# Patient Record
Sex: MALE | Race: WHITE
[De-identification: names, ages, dates, MRNs, and addresses within clinical notes are randomized per-mention and may not be internally consistent; named-entity substitution may affect disease eponyms.]

---

## 2017-04-19 NOTE — OP
PATIENT NAME:  AFRICA GARRETT                       MEDICAL RECORD: [id number]
:82                                             LOCATION:D.OPS          
                                                         ADMISSION DATE:        
SURGEON:  DANDRE WASHINGTON MD            
 
 
DATE OF OPERATION:  2017
 
REFERRED BY:  Dr. Peter Robert.
 
PRIMARY CARE PHYSICIAN:  Nabor Bello MD of [place].
 
PREOPERATIVE DIAGNOSES:  Chronic kidney disease stage V due to lupus nephritis
on systemic lupus erythematosus with organ involvement.
 
POSTOPERATIVE DIAGNOSES:  Chronic kidney disease stage V due to lupus nephritis
on systemic lupus erythematosus with organ involvement.
 
OPERATION PERFORMED:  Creation of a left wrist radiocephalic arteriovenous
fistula.
 
SURGEON:  Dandre Washington MD
 
ANESTHESIA:  General with LMA per CRNA.
 
PREOPERATIVE NOTE:  Mr. Garrett is a 36-year-old white male patient with lupus and
severe chronic kidney disease, it is believed he will require dialysis within a
few months and Dr. Peter Robert referred him to me for creation of a
dialysis access.  His mapping venous study demonstrated small veins and it is
thought that he may be a candidate only for a brachiobasilic fistula.  He is
brought to the operating room at this time with plans to try to create a fistula
in the left upper extremity as he is right handed, though I may possibly or will
consider placement of a forearm loop graft that the situation allows.
 
With the patient under anesthesia in supine position, he was prepped and draped
in sterile manner.  The left arm was treated with topical nitroglycerin paste
and a Penrose drain used as a proximal venous tourniquet.  Examination with high
resolution B mode ultrasound demonstrated a satisfactory cephalic and median
antebrachial veins in the forearm and at the wrist.  The radial artery was a
little small, about 3 mm in diameter, but did not appear to be calcified.  The
brachial artery was large.  There is a good median cubital vein, actually two,
one to the cephalic vein and one to the basilic.  The basilic vein in the upper
arm is larger venous drainage, although the cephalic vein in the upper arm was
larger than I anticipated.  I think that in the future the patient would be a
potential suitable candidate for a forearm loop AV graft and also for brachial
translocated basilic vein AV fistula and possibly even brachiocephalic.  Today,
I went ahead with a wrist fistula.
 
I made an incision on the radial aspect of the wrist and exposed the cephalic
vein and treated it with topical papaverine.  Tributaries were divided between
Vicryl ties and small Hemoclips.  The radial artery was exposed and it was as
expected somewhat small.  It was controlled with Silastic loops and treated with
topical papaverine.  The vein was transected and beveled and flushed with
heparinized saline, treated with additional papaverine.  The artery was occluded
and opened.  It was flushed proximally and distally with heparinized saline and
a vein-to-artery end-to-side anastomosis was then performed with running 7-0
Prolene and after the suture line was completed that was hemostatic and with
release of the occluding loops and clamps, excellent flow was established in the
 
 
 
OPERATIVE REPORT                               [id number]    AFRICA GARRETT     
 
 
new fistula.  The artery had been dilated with coronary artery dilators up to 3
mm in diameter with ease and I believe that the artery is large enough to carry
flow to support and maintain this fistula.  There was preservation of flow into
the hand via the ulnar artery.  Flow reversal in the distal radial artery was
noted.
 
The wound was irrigated with Ancef and gentamicin solution, infiltrated with
0.25% Marcaine with epinephrine and closed with interrupted inverted 3-0 Vicryl
and then running intracuticular 4-0 Monocryl and Dermabond glue.  The incision
was dressed with Maxorb Ag, Tegaderm and Cavilon skin prep and the patient
awakened from his anesthetic and was taken to the recovery room.
 
Blood loss during the operation was insignificant and unreplaced and all
sponges, instruments, and needles were accounted for.  No specimen was sent for
histopathology.  Blood loss was unreplaced and trivial.
 
Plan for Mr. Garrett to go home today.  He will come back to see me in my office
tomorrow or probably next week.  He can leave the original operative dressing
intact until then.  He can wash over with soap and water as it is quite
waterproof.  He will continue his home medications and was given an additional
prescription for tramadol 50 mg #30 tablet, he can take 1 or if needed 2 p.o.
q.4 hours p.r.n. pain.  He will continue all his other home medications and
renal diet at home.
 
TRANSINT:[id number] Voice Confirmation ID: [id number] DOCUMENT ID: [id number]
                                           
                                           DANDRE WASHINGTON MD            
 
 
 
Electronically Signed by DANDRE WASHINGTON on 17 at 1315
 
 
 
 
 
 
 
 
 
 
 
 
 
 
 
 
CC: NABOR BELLO MD and IRVIN ROBERT MD             [id number]
DICTATION DATE: 17 1052     :     17 1652      [facility] 
                                                                      17
[id number] [place] [address]

## 2017-06-17 ENCOUNTER — HOSPITAL ENCOUNTER (INPATIENT)
Dept: HOSPITAL 84 - D.ER | Age: 37
LOS: 3 days | Discharge: HOME | DRG: 641 | End: 2017-06-20
Attending: INTERNAL MEDICINE | Admitting: INTERNAL MEDICINE
Payer: MEDICAID

## 2017-06-17 VITALS
HEIGHT: 72 IN | WEIGHT: 248.52 LBS | BODY MASS INDEX: 33.66 KG/M2 | WEIGHT: 248.52 LBS | BODY MASS INDEX: 33.66 KG/M2 | BODY MASS INDEX: 33.66 KG/M2 | HEIGHT: 72 IN

## 2017-06-17 VITALS — SYSTOLIC BLOOD PRESSURE: 201 MMHG | DIASTOLIC BLOOD PRESSURE: 123 MMHG

## 2017-06-17 VITALS — DIASTOLIC BLOOD PRESSURE: 97 MMHG | SYSTOLIC BLOOD PRESSURE: 161 MMHG

## 2017-06-17 DIAGNOSIS — I12.9: ICD-10-CM

## 2017-06-17 DIAGNOSIS — N17.9: ICD-10-CM

## 2017-06-17 DIAGNOSIS — E83.52: Primary | ICD-10-CM

## 2017-06-17 DIAGNOSIS — D64.9: ICD-10-CM

## 2017-06-17 DIAGNOSIS — M32.14: ICD-10-CM

## 2017-06-17 DIAGNOSIS — E86.9: ICD-10-CM

## 2017-06-17 DIAGNOSIS — N18.4: ICD-10-CM

## 2017-06-17 LAB
ALBUMIN SERPL-MCNC: 3.3 G/DL (ref 3.4–5)
ALP SERPL-CCNC: 42 U/L (ref 46–116)
ALT SERPL-CCNC: 20 U/L (ref 10–68)
AMYLASE SERPL-CCNC: 53 U/L (ref 25–115)
ANION GAP SERPL CALC-SCNC: 18.1 MMOL/L (ref 8–16)
APPEARANCE UR: (no result)
BACTERIA #/AREA URNS HPF: (no result) /HPF
BASOPHILS NFR BLD AUTO: 0.1 % (ref 0–2)
BILIRUB SERPL-MCNC: 0.49 MG/DL (ref 0.2–1.3)
BILIRUB SERPL-MCNC: NEGATIVE MG/DL
BUN SERPL-MCNC: 43 MG/DL (ref 7–18)
CALCIUM SERPL-MCNC: 14.4 MG/DL (ref 8.5–10.1)
CHLORIDE SERPL-SCNC: 101 MMOL/L (ref 98–107)
CO2 SERPL-SCNC: 23.1 MMOL/L (ref 21–32)
COLOR UR: YELLOW
CREAT SERPL-MCNC: 5.4 MG/DL (ref 0.6–1.3)
EOSINOPHIL NFR BLD: 0.1 % (ref 0–7)
ERYTHROCYTE [DISTWIDTH] IN BLOOD BY AUTOMATED COUNT: 13.4 % (ref 11.5–14.5)
GLOBULIN SER-MCNC: 3.7 G/L
GLUCOSE SERPL-MCNC: 107 MG/DL (ref 74–106)
GLUCOSE SERPL-MCNC: NEGATIVE MG/DL
GRAN CASTS #/AREA URNS LPF: (no result) /LPF
HCT VFR BLD CALC: 33.9 % (ref 42–54)
HGB BLD-MCNC: 12.6 G/DL (ref 13.5–17.5)
HYALINE CASTS #/AREA URNS LPF: (no result) /LPF
IMM GRANULOCYTES NFR BLD: 0.6 % (ref 0–5)
KETONES UR STRIP-MCNC: NEGATIVE MG/DL
LEUKOCYTE ESTERASE: NEGATIVE
LIPASE SERPL-CCNC: 154 U/L (ref 73–393)
LYMPHOCYTES NFR BLD AUTO: 6 % (ref 15–50)
MCH RBC QN AUTO: 34.7 PG (ref 26–34)
MCHC RBC AUTO-ENTMCNC: 37.2 G/DL (ref 31–37)
MCV RBC: 93.4 FL (ref 80–100)
MONOCYTES NFR BLD: 2.6 % (ref 2–11)
NEUTROPHILS NFR BLD AUTO: 90.6 % (ref 40–80)
NITRITE UR-MCNC: NEGATIVE MG/ML
OSMOLALITY SERPL CALC.SUM OF ELEC: 286 MOSM/KG (ref 275–300)
PH UR STRIP: 5 [PH] (ref 5–6)
PLATELET # BLD: 221 10X3/UL (ref 130–400)
PMV BLD AUTO: 10.3 FL (ref 7.4–10.4)
POTASSIUM SERPL-SCNC: 4.2 MMOL/L (ref 3.5–5.1)
PROT SERPL-MCNC: 7 G/DL (ref 6.4–8.2)
PROT UR-MCNC: (no result) MG/DL
RBC # BLD AUTO: 3.63 10X6/UL (ref 4.2–6.1)
RBC #/AREA URNS HPF: (no result) /HPF (ref 0–5)
SODIUM SERPL-SCNC: 138 MMOL/L (ref 136–145)
SP GR UR STRIP: 1.01 (ref 1–1.02)
SQUAMOUS #/AREA URNS HPF: (no result) /HPF (ref 0–5)
UROBILINOGEN UR-MCNC: NORMAL MG/DL
WAXY CASTS #/AREA URNS LPF: (no result) /LPF
WBC # BLD AUTO: 9.4 10X3/UL (ref 4.8–10.8)
WBC #/AREA URNS HPF: (no result) /HPF (ref 0–5)

## 2017-06-17 NOTE — DS
PATIENT:AFRICA GARRETT               :[birth year]   MEDICAL RECORD: [id number]
 
                              DISCHARGE SUMMARY
                                                         
ADMISSION DATE:    17                       DISCHARGE DATE:     17
 
 
REASON FOR ADMISSION:
1.  Hypercalcemia.
2.  Shortness of breath, which has resolved.
3.  Chest pain, which has resolved.
4.  Nausea and vomiting, which has resolved.
 
HISTORY OF PRESENT ILLNESS:  This 36-year-old gentleman with lupus nephritis,
followed by Dr. Robert.  He has not been seeing his lupus doctors either.  He
presented with about 6 days of not feeling well with shortness of breath,
dyspnea on exertion, inability to hold down foods, some dyspepsia and loose
stools.  It was discovered that his calcium was 14.4.  He has been ready to go
home for the past 2 days, was a little concerned about shortness of breath and
was going to perform a VQ scan today; however, indicated he does not have any
further dyspnea on exertion and had a negative venous Doppler.  His potassium
was a little low, but we started Urocit-K 20 mEq 3 times a day and he is going
to consume potassium today and have lab work with Dr. Robert tomorrow.  BUN is
37; creatinine 4.8, this is above his baseline of around 3.5; however, he does
have bilateral kidney stones.  On discharge, he is 138/87, 77 pulse.  He is
alert and oriented times 3.  Normocephalic and atraumatic.  Clear nares.  Clear
throat.  No JVD or thyromegaly.  He is up and walking in his room.  Clear lungs.
 Chest is regular rhythm.  No clubbing, cyanosis or edema.  No urticarial rash. 
H&H was 9.4/26 with a white count of 6400 and platelet count of 157.  He is
likely going to need erythropoietin.  Potassium is 3.2.  As I mentioned,
creatinine 4.8, CO2 was 22, albumin 2.4, alkaline phosphatase 36. 
Echocardiogram as noted, CT of the abdomen and pelvis.  C. diff cultures are
negative as well.  Urine culture is still pending, but did have positive guaiac
stool and he is on Plavix.
 
DISPOSITION:  Discharge to home.
 
MEDICATIONS:  New medications are Sensipar 60 mg once a day, Urocit-K 20 mEq 3
times a day and Bactrim-DS 1 a day for 5 days.
 
He is to follow up with Dr. Robert for which I wrote him a detail of note, renal
diet.  Stable on discharge, to return for any problems.  I did clarify that he
has been ready to go home.  We did have a positive guaiac, anemia that we have
to deal with as well as followup of his lupus nephritis and hypercalcemia workup
as his PTH and other labs are still pending.
 
TRANSINT:[id number] Voice Confirmation ID: [id number] DOCUMENT ID: [id number]
                                           
                                           ARIANNE ROJAS MD              
 
CC:                                                             [id number]
DICTATION DATE: 17     :     17 0051      DIS IN  
                                                                      17
[facility]                                          
[address] [place] [address]

## 2017-06-17 NOTE — EC
PATIENT:AFRICA GARRETT             DATE OF SERVICE: 06/17/17
SEX: M                                  MEDICAL RECORD: [id number]
DATE OF BIRTH: [birth year]                        LOCATION:D.M2      D.210
AGE OF PATIENT: 36                             ADMISSION DATE: 06/17/17
 
REFERRING PHYSICIAN:                               
 
INTERPRETING PHYSICIAN: KALEN HERNANDEZ MD             
 
 
 
                             ECHOCARDIOGRAM REPORT
  ECHO CHARGES 4               ECHO COMPLETE            
 
 
 
CLINICAL DIAGNOSIS: SOB/HTN                       
 
                         ECHOCARDIOGRAPHIC MEASUREMENTS
      (adult normal given)
        AC root (d.<3.7cm) 3.4    LV Septum d (<1.2 cm> 1.5 
           Valve Excursion 2.5      LV Septum (systole) 2.4 
     Left Atria (s.<4.0cm> 4.3           LVPW d(<1.2cm) 1.5 
             RV (d.<2.3cm) 2.9            LVPW (sytole) 2.5 
       LV diastole(<5.6CM) 6.2        MV E-F(>70mm/sec)     
                LV systole 3.3            LVOT Diameter 2.1 
            MV exc.(>10mm)     
Est.ejection fraction (50-75%)     Pericardial Effusion N
 
   DOPPLER:
     LVIT       A 82.0 E 54.0
       LA      RVSP 33.0
     LVOT 131  AOP1/2T     
  Asc. Ao 187 
     RVOT 74.0
       RA     
        
 AV Gradient Peak 14.0  AV Mean 5.8   AV Area 2.5 
 MV Gradient Peak 3.1   MV Mean 1.2   MV Area     
   COMMENTS:                                              
 
 
 Cardiac Sonographer: Saundra REVELESOE            
      Cardiologist:1          Dr. Hernandez                
             TAPE# PACS           
                                                                                     
 
 
DATE OF SERVICE:  06/18/2017
 
Echocardiogram
 
FINDINGS:
1.  Left ventricle chamber size is within normal limits.  Left ventricular
systolic function is normal.  Overall ejection fraction estimated at 60%.
2.  Left atrium is enlarged at 4.3 cm.  Right atrium and right ventricle chamber
sizes are as well mildly dilated.
3.  Valvular structures have normal structure and motion.
 
 
 
ECHOCARDIOGRAM REPORT                          [id number]    AFRICA GARRETT     
 
 
4.  Doppler interrogation reveals mild to moderate aortic insufficiency, mild
mitral regurgitation, mild tricuspid regurgitation, no other valvular
insufficiency or stenosis.  Pulmonary systolic pressure is normal estimated at
33 mmHg.
5.  No evidence of pericardial effusion or left ventricular thrombus.
 
TRANSINT:[id number] Voice Confirmation ID: [id number] DOCUMENT ID: [id number]
                                           
                                           KALEN HERNANDEZ MD             
 
 
 
 
 
 
 
 
 
 
 
 
 
 
 
 
 
 
 
 
 
 
 
 
 
 
 
 
 
 
 
 
 
 
 
 
 
CC:                                                             [id number]
DICTATION DATE: 06/19/17 1138     :     06/19/17 1630      ADM IN  
                                                                              
[facility]                                          
[address] [facility]

## 2017-06-17 NOTE — NUR
RECEIVED CALL FROM NISREEN HUGO ON CALL. NEW ORDERS RECEIVED TO ENACT UPON
PT'S ARRIVAL. ENTERED INTO CPOE AND NURSE ADAMARIS LPN NOTIFIED OF NEW ORDERS.

## 2017-06-17 NOTE — NUR
RECIEVED TO ROOM 2105 FROM ER VIA WC.  PT A&O.  RESPERATIONS EVEN ON ROOM AIR.
IV TO RIGHT HAND WITH NS INFUSING  CC/HR, SITE CLEAN AND DRY.  LEFT
WRIST AV FISTULA NOTED, RESERVE LEFT ARM SIGNS PLACED ON OUT SIDE OF DOOR AND
ABOVE BED.  VITALS OBTAINED, PT DENIES PAIN OR NEEDS.

## 2017-06-18 VITALS — DIASTOLIC BLOOD PRESSURE: 109 MMHG | SYSTOLIC BLOOD PRESSURE: 193 MMHG

## 2017-06-18 VITALS — SYSTOLIC BLOOD PRESSURE: 147 MMHG | DIASTOLIC BLOOD PRESSURE: 89 MMHG

## 2017-06-18 VITALS — SYSTOLIC BLOOD PRESSURE: 163 MMHG | DIASTOLIC BLOOD PRESSURE: 91 MMHG

## 2017-06-18 VITALS — DIASTOLIC BLOOD PRESSURE: 88 MMHG | SYSTOLIC BLOOD PRESSURE: 152 MMHG

## 2017-06-18 LAB
AMYLASE SERPL-CCNC: 48 U/L (ref 25–115)
ANION GAP SERPL CALC-SCNC: 19.9 MMOL/L (ref 8–16)
BASOPHILS NFR BLD AUTO: 0.1 % (ref 0–2)
BUN SERPL-MCNC: 41 MG/DL (ref 7–18)
CALCIUM SERPL-MCNC: 11.9 MG/DL (ref 8.5–10.1)
CHLORIDE SERPL-SCNC: 105 MMOL/L (ref 98–107)
CK MB SERPL-MCNC: 0.4 U/L (ref 0–3.6)
CK SERPL-CCNC: 18 UL (ref 21–232)
CO2 SERPL-SCNC: 19.7 MMOL/L (ref 21–32)
CREAT SERPL-MCNC: 5.2 MG/DL (ref 0.6–1.3)
EOSINOPHIL NFR BLD: 0.3 % (ref 0–7)
ERYTHROCYTE [DISTWIDTH] IN BLOOD BY AUTOMATED COUNT: 13.6 % (ref 11.5–14.5)
GLUCOSE SERPL-MCNC: 92 MG/DL (ref 74–106)
HCT VFR BLD CALC: 30.6 % (ref 42–54)
HGB BLD-MCNC: 11.2 G/DL (ref 13.5–17.5)
IMM GRANULOCYTES NFR BLD: 0.7 % (ref 0–5)
LIPASE SERPL-CCNC: 257 U/L (ref 73–393)
LYMPHOCYTES NFR BLD AUTO: 7.3 % (ref 15–50)
MCH RBC QN AUTO: 34.8 PG (ref 26–34)
MCHC RBC AUTO-ENTMCNC: 36.6 G/DL (ref 31–37)
MCV RBC: 95 FL (ref 80–100)
MONOCYTES NFR BLD: 4.7 % (ref 2–11)
NEUTROPHILS NFR BLD AUTO: 86.9 % (ref 40–80)
OSMOLALITY SERPL CALC.SUM OF ELEC: 290 MOSM/KG (ref 275–300)
PLATELET # BLD: 194 10X3/UL (ref 130–400)
PMV BLD AUTO: 10.2 FL (ref 7.4–10.4)
POTASSIUM SERPL-SCNC: 3.6 MMOL/L (ref 3.5–5.1)
RBC # BLD AUTO: 3.22 10X6/UL (ref 4.2–6.1)
SODIUM SERPL-SCNC: 141 MMOL/L (ref 136–145)
TROPONIN I SERPL-MCNC: 0.03 NG/ML (ref 0–0.06)
WBC # BLD AUTO: 10.4 10X3/UL (ref 4.8–10.8)

## 2017-06-18 NOTE — NUR
ASSESSMENT COMPLETE, A&O, IN BED RESTING, IV TO RIGHT HAND WITH NS 
CC/HR.  SITE CLEAN AND DRY.  PT DENIES PAIN OR NEEDS, BED LOW, CL IN REACH.

## 2017-06-18 NOTE — HP
PATIENT: AFRICA GARRETT                             MEDICAL RECORD: [id number]
ACCOUNT: [id number]                                    LOCATION:D.    D.2105
: 82                                            ADMISSION DATE: 17
                                                         
 
                             HISTORY AND PHYSICAL EXAMINATION
 
 
REASON FOR ADMISSION:
1.  Intractable nausea and vomiting.
2.  Acute kidney injury on chronic kidney disease.
3.  Intravascular volume depletion.
4.  Proteinuria.
5.  Hypercalcemia with a calcium of 14.4.
 
HISTORY OF PRESENT ILLNESS:  This is a 36-year-old gentleman followed by Dr. Peter Robert who presented to the Emergency Room with not feeling well for 6
days with off and on nausea and vomiting.  His calcium was 4.4 and albumin 3.3.
 
PAST MEDICAL HISTORY:
1.  CKD due to lupus.
2.  Hypercalcemia.
3.  Incomplete database as we are obtaining his medications from home.
 
PAST SURGICAL HISTORY:  Renal biopsy.
 
ALLERGIES:  NSAIDS.
 
SOCIAL HISTORY:  No tobacco, alcohol or illicit drugs.
 
FAMILY HISTORY:  Noncontributory for lupus nephritis in his mother and father. 
He is unaware of heart disease or malignancy.
 
PHYSICAL EXAMINATION:
VITAL SIGNS:  He is 165/86, 71 pulse, 97.8 temperature.
GENERAL:  Alert and oriented times 3.
HEENT:  Normocephalic and atraumatic.  Clear nares.  Clear throat.
NECK:  No JVD or thyromegaly.
CHEST:  Regular rhythm.
LUNGS:  Grossly clear.
ABDOMEN:  Nontender in all 4 quadrants.  Negative Babinski.
SKIN:  No urticarial rash.
 
LABORATORY DATA:  H&H is 12.6/33 with a white count of 9400.  Sodium of 138, BUN
43, creatinine 5.4, calcium 14.4.  Specific gravity of his urine is 1.015, 5-10
white blood cells, moderate bacteria.
 
ASSESSMENT AND PLAN:
1.  Acute kidney injury with hypercalcemia.  We will determine his medications. 
Begin normal saline, increase the rate and consider IV Lasix.  We will start
Sensipar if his nausea and vomiting improves.
2.  Lupus nephritis.  He is on medication that will need to obtain from the
office for nephrotic syndrome.
3.  Deep venous thrombosis risk.  We will need to place on Lovenox.  I have
asked him to ambulate.
4.  Hypercalcemia.  We will determine the etiology.  He may be taking vitamin D
at home oral and even calcium supplements or phosphorus.  We will follow his lab
work.
 
 
 
HISTORY AND PHYSICAL                           [id number]    AFRICA GARRETT     
 
 
5.  Incomplete database as we need his medications.
6.  Hypertension.
 
PLAN:  Please see orders.
 
TRANSINT:[id number] Voice Confirmation ID: [id number] DOCUMENT ID: [id number]
 
 
                                           
                                           ARIANNE ROJAS MD              
 
 
 
Electronically Signed by ARIANNE ROJAS on 17 at 1032
 
 
 
 
 
 
 
 
 
 
 
 
 
 
 
 
 
 
 
 
 
 
 
 
 
 
 
 
 
 
 
 
 
CC:                                                             [id number]
DICTATION DATE: 17     :     178      ADM IN  
                                                                              
[facility]                                          
 [id number]

## 2017-06-19 VITALS — DIASTOLIC BLOOD PRESSURE: 70 MMHG | SYSTOLIC BLOOD PRESSURE: 135 MMHG

## 2017-06-19 VITALS — DIASTOLIC BLOOD PRESSURE: 89 MMHG | SYSTOLIC BLOOD PRESSURE: 152 MMHG

## 2017-06-19 VITALS — SYSTOLIC BLOOD PRESSURE: 151 MMHG | DIASTOLIC BLOOD PRESSURE: 93 MMHG

## 2017-06-19 VITALS — SYSTOLIC BLOOD PRESSURE: 135 MMHG | DIASTOLIC BLOOD PRESSURE: 85 MMHG

## 2017-06-19 VITALS — DIASTOLIC BLOOD PRESSURE: 92 MMHG | SYSTOLIC BLOOD PRESSURE: 149 MMHG

## 2017-06-19 VITALS — DIASTOLIC BLOOD PRESSURE: 83 MMHG | SYSTOLIC BLOOD PRESSURE: 149 MMHG

## 2017-06-19 LAB
ALBUMIN SERPL-MCNC: 2.7 G/DL (ref 3.4–5)
ALP SERPL-CCNC: 32 U/L (ref 46–116)
ALT SERPL-CCNC: 18 U/L (ref 10–68)
ANION GAP SERPL CALC-SCNC: 11.7 MMOL/L (ref 8–16)
BASOPHILS NFR BLD AUTO: 0.2 % (ref 0–2)
BILIRUB SERPL-MCNC: 0.4 MG/DL (ref 0.2–1.3)
BUN SERPL-MCNC: 34 MG/DL (ref 7–18)
CALCIUM SERPL-MCNC: 10.1 MG/DL (ref 8.5–10.1)
CHLORIDE SERPL-SCNC: 108 MMOL/L (ref 98–107)
CO2 SERPL-SCNC: 18.5 MMOL/L (ref 21–32)
CREAT SERPL-MCNC: 4.7 MG/DL (ref 0.6–1.3)
EOSINOPHIL NFR BLD: 0.2 % (ref 0–7)
ERYTHROCYTE [DISTWIDTH] IN BLOOD BY AUTOMATED COUNT: 13.4 % (ref 11.5–14.5)
GLOBULIN SER-MCNC: 2.8 G/L
GLUCOSE SERPL-MCNC: 114 MG/DL (ref 74–106)
HCT VFR BLD CALC: 29.3 % (ref 42–54)
HGB BLD-MCNC: 10.4 G/DL (ref 13.5–17.5)
IMM GRANULOCYTES NFR BLD: 1.1 % (ref 0–5)
LYMPHOCYTES NFR BLD AUTO: 5.6 % (ref 15–50)
MAGNESIUM SERPL-MCNC: 1.4 MG/DL (ref 1.8–2.4)
MCH RBC QN AUTO: 34.1 PG (ref 26–34)
MCHC RBC AUTO-ENTMCNC: 35.5 G/DL (ref 31–37)
MCV RBC: 96.1 FL (ref 80–100)
MONOCYTES NFR BLD: 3.4 % (ref 2–11)
NEUTROPHILS NFR BLD AUTO: 89.5 % (ref 40–80)
OSMOLALITY SERPL CALC.SUM OF ELEC: 278 MOSM/KG (ref 275–300)
PHOSPHATE SERPL-MCNC: 4.7 MG/DL (ref 2.5–4.9)
PLATELET # BLD: 180 10X3/UL (ref 130–400)
PMV BLD AUTO: 10.7 FL (ref 7.4–10.4)
POTASSIUM SERPL-SCNC: 3.2 MMOL/L (ref 3.5–5.1)
PROT SERPL-MCNC: 5.5 G/DL (ref 6.4–8.2)
RBC # BLD AUTO: 3.05 10X6/UL (ref 4.2–6.1)
SODIUM SERPL-SCNC: 135 MMOL/L (ref 136–145)
WBC # BLD AUTO: 6.1 10X3/UL (ref 4.8–10.8)

## 2017-06-19 NOTE — NUR
AM ROUNDS- PT IN BED, DENIES ANY NEEDS AT THIS TIME. BED LOW AND LOCKED,
BEDSIDE RAILS X2, CALL LIGHT IN REACH, NAD NOTED, WILL CONTINUE TO MONITOR.

## 2017-06-20 VITALS — DIASTOLIC BLOOD PRESSURE: 83 MMHG | SYSTOLIC BLOOD PRESSURE: 143 MMHG

## 2017-06-20 VITALS — SYSTOLIC BLOOD PRESSURE: 158 MMHG | DIASTOLIC BLOOD PRESSURE: 99 MMHG

## 2017-06-20 VITALS — SYSTOLIC BLOOD PRESSURE: 138 MMHG | DIASTOLIC BLOOD PRESSURE: 87 MMHG

## 2017-06-20 LAB
ALBUMIN SERPL-MCNC: 2.4 G/DL (ref 3.4–5)
ALP SERPL-CCNC: 36 U/L (ref 46–116)
ALT SERPL-CCNC: 17 U/L (ref 10–68)
ANION GAP SERPL CALC-SCNC: 14.1 MMOL/L (ref 8–16)
BASOPHILS NFR BLD AUTO: 0.2 % (ref 0–2)
BILIRUB SERPL-MCNC: 0.24 MG/DL (ref 0.2–1.3)
BUN SERPL-MCNC: 37 MG/DL (ref 7–18)
CALCIUM SERPL-MCNC: 9.3 MG/DL (ref 8.5–10.1)
CHLORIDE SERPL-SCNC: 110 MMOL/L (ref 98–107)
CO2 SERPL-SCNC: 22.1 MMOL/L (ref 21–32)
CREAT SERPL-MCNC: 4.8 MG/DL (ref 0.6–1.3)
EOSINOPHIL NFR BLD: 0.6 % (ref 0–7)
ERYTHROCYTE [DISTWIDTH] IN BLOOD BY AUTOMATED COUNT: 13.6 % (ref 11.5–14.5)
GLOBULIN SER-MCNC: 2.5 G/L
GLUCOSE SERPL-MCNC: 110 MG/DL (ref 74–106)
HCT VFR BLD CALC: 26 % (ref 42–54)
HGB BLD-MCNC: 9.4 G/DL (ref 13.5–17.5)
IMM GRANULOCYTES NFR BLD: 1.4 % (ref 0–5)
LYMPHOCYTES NFR BLD AUTO: 7.5 % (ref 15–50)
MCH RBC QN AUTO: 34.7 PG (ref 26–34)
MCHC RBC AUTO-ENTMCNC: 36.2 G/DL (ref 31–37)
MCV RBC: 95.9 FL (ref 80–100)
MONOCYTES NFR BLD: 4.5 % (ref 2–11)
NEUTROPHILS NFR BLD AUTO: 85.8 % (ref 40–80)
OSMOLALITY SERPL CALC.SUM OF ELEC: 294 MOSM/KG (ref 275–300)
PLATELET # BLD: 157 10X3/UL (ref 130–400)
PMV BLD AUTO: 10.3 FL (ref 7.4–10.4)
POTASSIUM SERPL-SCNC: 3.2 MMOL/L (ref 3.5–5.1)
PROT SERPL-MCNC: 4.9 G/DL (ref 6.4–8.2)
RBC # BLD AUTO: 2.71 10X6/UL (ref 4.2–6.1)
SODIUM SERPL-SCNC: 143 MMOL/L (ref 136–145)
WBC # BLD AUTO: 6.4 10X3/UL (ref 4.8–10.8)

## 2017-06-20 NOTE — NUR
PT ADVISED HIS PHARMACY HAS CHANGED TO [facility]. NEW DISCHARGE
MEDICATIONS CALLED TO [facility]. SPOKE WITH EMELY/PHARMACIST.

## 2017-06-20 NOTE — NUR
D/C INSTRUCTIONS EXPLAINED TO PT. D/C PAPERWORK SIGNED BY PT AND PLACED IN
CHART. PER PT REQUEST IV IS BEING LEFT IN TO RIGHT FOREARM (WITH DR. ROJAS
APPROVAL) BECAUSE PT IS GOING TO DIALYSIS CENTER TOMORROW AND WANTS TO LEAVE
IT IN FOR THEM TO DRAW BLOOD OUT OF. AWAITING PTS WIFE TO TAKE HIM HOME. WILL
D/C PT WHEN WIFE GETS HERE. WILL CONTINUE TO MONITOR.

## 2017-06-20 NOTE — NUR
Patient Name: AFRICA GARRETT Admission Status: ER
Accout number: [id number] Admission Date: 2017
: 1980 Admission Diagnosis:
Attending: JELENA Current LOS: 3
 
Anticipated DC Date: 2017
Planned Disposition: Home
Primary Insurance: MEDICAID [place]
 
 
Discharge Planning Comments:
* Is the patient Alert and Oriented? Yes 0
* How many steps to enter\exit or inside your home? NONE 0
* PCP DR. CAMARENA, [place] 0
OR - ED [facility]
* Pharmacy [facility] IN [place] 0
* Preadmission Environment Home with Family 0
* ADLs Independent 0
* Equipment Walker 0
* Other Equipment NO MEDICAL EQUIPMENT PROVIDER PREFERENCE 0
* List name and contact numbers for known caregivers / representatives who
currently or will assist patient after discharge: KURTIS GARRETT, [place],
[phone] 0
* Community resources currently utilized None 0
* Please name any agencies selected above. NONE 0
* Additional services required to return to the preadmission environment? No 0
* Can the patient safely return to the preadmission environment? Yes 0
* Has this patient been hospitalized within the prior 30 days at any hospital?
No 0
 
CM MET WITH PT IN ROOM TO DISCUSS DISCHARGE PLANNING AND NEEDS. PT REPORTS
LIVING AT HOME INDEPENDENTLY WITH SPOUSE. PT HAS WALKER THAT HE DOES NOT USE
AND NO MEDICAL EQUIPMENT PROVIDER PREFERENCE. PT HAS NO OUTSIDE SERVICES
ASSISTING IN THE HOME. CM DISCUSSED AVAILABILITY OF HOME HEALTH, REHAB
SERVICES AND MEDICAL EQUIPMENT. PT DENIES DISCHARGE NEEDS, REPORTS HIS WIFE
WILL PICK HIM UP FOR DISCHARGE HOME TODAY.
 
: Dionicio Yang

## 2017-06-20 NOTE — NUR
NURSE ROUNDS 06/19/17 21:45 - PT LYING IN BED, AWAKE, ALERT, ORIENTED,
VISITORS IN ROOM. PT STATED HE HAS HAD TRANSIENT NAUSEA AND REQUESTED ZOFRAN,
HOWEVER, IV INFILTRATED IMMEDIATELY UPON BEGINNING TO PUSH. IV REMOVED WITH
CATH TIP INTACT. WILL RESITE. PT STATES HE IS A VERY DIFFICULT IV STICK, AND
HAS ORDERS FOR A CVL. CONTINUE TO MONITOR CLOSELY.

## 2017-06-20 NOTE — NUR
AM ROUNDING- RECEIVED REPORT FROM NIGHT SHIFT NURSE HERB. PT IS CURRENTLY
LAYING IN BED ON RIGHT SIDE WITH EYES CLOSED RESTING. RESERVE LEFT ARM FOR AVF
(PER REPORT DOES NOT CURRENTLY WORK). ON ROOM AIR. NO MONITOR. IV SEEN TO
RIGHT FOREARM THAT IS CURRENTLY SALINE LOCKED. NO NEED AT CURRENT TIME. WILL
CONTINUE TO MONITOR AND CONTINUE WITH PLAN OF CARE.

## 2017-07-20 LAB
ANION GAP SERPL CALC-SCNC: 17.4 MMOL/L (ref 8–16)
APTT BLD: 26.6 SECONDS (ref 22.8–39.4)
BASOPHILS NFR BLD AUTO: 0.1 % (ref 0–2)
BUN SERPL-MCNC: 56 MG/DL (ref 7–18)
CALCIUM SERPL-MCNC: 9.8 MG/DL (ref 8.5–10.1)
CHLORIDE SERPL-SCNC: 106 MMOL/L (ref 98–107)
CO2 SERPL-SCNC: 20.8 MMOL/L (ref 21–32)
CREAT SERPL-MCNC: 3.8 MG/DL (ref 0.6–1.3)
EOSINOPHIL NFR BLD: 0.3 % (ref 0–7)
ERYTHROCYTE [DISTWIDTH] IN BLOOD BY AUTOMATED COUNT: 14.5 % (ref 11.5–14.5)
GLUCOSE SERPL-MCNC: 120 MG/DL (ref 74–106)
HCT VFR BLD CALC: 30.6 % (ref 42–54)
HGB BLD-MCNC: 10.7 G/DL (ref 13.5–17.5)
IMM GRANULOCYTES NFR BLD: 1.2 % (ref 0–5)
INR PPP: 0.99 (ref 0.85–1.17)
LYMPHOCYTES NFR BLD AUTO: 4.3 % (ref 15–50)
MCH RBC QN AUTO: 34.5 PG (ref 26–34)
MCHC RBC AUTO-ENTMCNC: 35 G/DL (ref 31–37)
MCV RBC: 98.7 FL (ref 80–100)
MONOCYTES NFR BLD: 2.2 % (ref 2–11)
NEUTROPHILS NFR BLD AUTO: 91.9 % (ref 40–80)
OSMOLALITY SERPL CALC.SUM OF ELEC: 294 MOSM/KG (ref 275–300)
PLATELET # BLD: 141 10X3/UL (ref 130–400)
PMV BLD AUTO: 9.7 FL (ref 7.4–10.4)
POTASSIUM SERPL-SCNC: 5.2 MMOL/L (ref 3.5–5.1)
PROTHROMBIN TIME: 12.9 SECONDS (ref 11.6–15)
RBC # BLD AUTO: 3.1 10X6/UL (ref 4.2–6.1)
SODIUM SERPL-SCNC: 139 MMOL/L (ref 136–145)
WBC # BLD AUTO: 9.9 10X3/UL (ref 4.8–10.8)

## 2017-07-21 ENCOUNTER — HOSPITAL ENCOUNTER (OUTPATIENT)
Dept: HOSPITAL 84 - D.OPS | Age: 37
Discharge: HOME | End: 2017-07-21
Attending: INTERNAL MEDICINE
Payer: MEDICAID

## 2017-07-21 VITALS
BODY MASS INDEX: 33.86 KG/M2 | WEIGHT: 250 LBS | HEIGHT: 72 IN | WEIGHT: 250 LBS | BODY MASS INDEX: 33.86 KG/M2 | HEIGHT: 72 IN

## 2017-07-21 VITALS — DIASTOLIC BLOOD PRESSURE: 91 MMHG | SYSTOLIC BLOOD PRESSURE: 178 MMHG

## 2017-07-21 DIAGNOSIS — Z01.812: ICD-10-CM

## 2017-07-21 DIAGNOSIS — M32.14: ICD-10-CM

## 2017-07-21 DIAGNOSIS — N18.5: Primary | ICD-10-CM

## 2017-07-21 NOTE — NUR
1215--IV DC'D, PT UP TO DRESS AT THIS TIME. VARSHA DEE
 
1230--DISCHARGE INSTRUCTIONS GIVEN, PT VERBALIZES UNDERSTANDING. PT
OFF UNIT VIA WC. VARSHA DEE

## 2017-07-21 NOTE — NUR
4340--PT COMPLAINS OF PAIN, RATES PAIN 7/10. HYDROCODONE 5/325MG
GIVEN PO FOR PAIN. VARSHA DEE
 
1510--IV DC'D, PT UP TO DRESS. VARSHA DEE
 
1786--DISCHARGE INSTRUCTIONS GIVEN, PT VERBALIZES UNDERSTANDING. PT
OFF UNIT VIA WC. VARSHA DEE

## 2017-07-25 NOTE — OP
PATIENT NAME:  AFRICA GARRETT                       MEDICAL RECORD: [id number]
:82                                             LOCATION:D.OPS          
                                                         ADMISSION DATE:        
SURGEON:  DANDRE WASHINGTON MD            
 
OPERATION DATE: 17
 
 
DATE OF OPERATION:  2017
 
PREOPERATIVE DIAGNOSIS:  Chronic kidney disease stage V and systemic lupus
erythematosus with renal involvement.
 
POSTOPERATIVE DIAGNOSIS:  Chronic kidney disease stage V and systemic lupus
erythematosus with renal involvement.
 
OPERATION PERFORMED:  Implantation of a left forearm loop PTFE graft between the
medial brachial artery and the slightly more lateral median cubital vein, which
drains essentially completely to the basilic vein.  The graft is a 6 mm diameter
straight standard wall thickness [place] Propaten PTFE.
 
SURGEON:  Dandre Washington MD
 
ANESTHESIA:  Regional nerve block plus general endotracheal anesthesia per RONEY
and Dr. Tripathi.
 
PREOPERATIVE NOTE:  Mr. Garrett is a 36-year-old white male patient with lupus and
near end-stage renal disease.  He needs to begin dialysis fairly soon.  He had a
left wrist radiocephalic AV fistula established several months ago and it
functioned for a couple of months and then recently thrombosed.  Hopefully, that
fistula has provided some development of the proximal veins.
 
Under anesthesia, the patient was placed in supine position, prepped, draped in
sterile manner.  I used a Penrose drain as a proximal venous tourniquet and
applied nitropaste to the skin of the arm and forearm.  I examined him with
ultrasound and noted the brachial artery and median cubital vein to the
excellent for use for forearm PTFE loop.  The median cubital vein did not drain
at all into the cephalic vein in the upper arm, but appears to drain 100% into
the basilic cyst.  So, I thought that doing a loop graft at this point was the
best option as he really could not have a brachial cephalic fistula.  The
brachial artery bifurcated fairly proximally and the subsequent arterial
anastomosis was made immediately proximal to that bifurcation.
 
I made a transverse incision over the antecubital space or just below it and
exposed the median cubital vein and the brachial artery and its bifurcation. 
The vessels were controlled with Silastic loops.  I made a counter incision or
reopened a portion of the prior wrist AV fistula incision for the
counterincision and placed a 6 mm PTFE Propaten graft in a subcutaneous tunnel
looping around the forearm and kept the grafts close to the skin as possible. 
The artery was opened for a distance for about 8 mm and the PTFE graft was
shortened and beveled and then anastomosed end-to-side to the artery with
running 6-0 Prolene after the artery was flushed proximally and distally with
heparinized saline.  With completion of the anastomosis, the suture line was
hemostatic.  It was treated with Evicel and some fibrillar hemostatic material
utilized initially.  The graft was then flushed again with heparinized saline
and clamped and the vein treated repeatedly with topical papaverine was occluded
with Silastic loops and a venotomy made and the graft shortened and beveled and
about a 12-mm long end of graft to side of vein anastomosis was carried out with
 
 
 
OPERATIVE REPORT                               [id number]    GERRYAFRICA     
 
 
running 6-0 Prolene.  With completion of the anastomosis and after approximately
60 seconds following application of Evicel, the occluding loops and clamps were
released and excellent flow developed immediately in the new AV graft.  The
wounds were irrigated with Ancef/gentamicin solution and closed without the use
of drain approximating the subcutaneous tissues with interrupted inverted 3-0
Vicryl and the skin with running intracuticular 4-0 Monocryl and Dermabond glue.
 The wounds were dressed with Maxorb Ag, Tegaderm and Cavilon skin prep.
 
The patient will be able to go home from the outpatient department today.  He
was given a prescription for 20 Norco 5/325 tablets.  He can take 1 every 4
hours p.r.n. for pain.  He is encouraged to elevate his arm over the next
several days to help reduce postoperative edema and an appointment scheduled for
him see me in my office next week.  He is to continue all of the same home
medications and diet and resume activities as tolerated.  He may wear his sling
to support his arm for the first 24 hours or so or until the motor function
returns in his arm as the block wears off.  Otherwise, he will resume activities
as I said and he is to shower and wash over the waterproof plastic dressings
with soap and water daily.
 
Blood loss was insignificant and replaced.  All sponges, instruments and needles
were accounted for.  No drain was used and no surgical specimen was submitted
for histopathology.
 
TRANSINT:[id number] Voice Confirmation ID: [id number] DOCUMENT ID: [id number]
                                           
                                           DANDRE WASHINGTON MD            
 
 
 
Electronically Signed by DANDRE WASHINGTON on 17 at 1109
 
 
 
 
 
 
 
 
 
 
 
 
 
 
 
 
 
CC: IRVIN SOUSA MD                                                [id number]
DICTATION DATE: 17 1153     :     17      [facility] 
                                                                      17
[facility]                                          
 [place] [address]

## 2017-10-20 ENCOUNTER — HOSPITAL ENCOUNTER (EMERGENCY)
Dept: HOSPITAL 84 - D.ER | Age: 37
Discharge: HOME | End: 2017-10-20
Payer: MEDICAID

## 2017-10-20 VITALS — BODY MASS INDEX: 34 KG/M2

## 2017-10-20 DIAGNOSIS — R11.10: ICD-10-CM

## 2017-10-20 DIAGNOSIS — N20.1: ICD-10-CM

## 2017-10-20 DIAGNOSIS — E86.0: Primary | ICD-10-CM

## 2017-10-20 DIAGNOSIS — M32.9: ICD-10-CM

## 2017-10-20 DIAGNOSIS — I10: ICD-10-CM

## 2017-10-20 LAB
ALBUMIN SERPL-MCNC: 2.8 G/DL (ref 3.4–5)
ALP SERPL-CCNC: 36 U/L (ref 46–116)
ALT SERPL-CCNC: 15 U/L (ref 10–68)
AMYLASE SERPL-CCNC: 55 U/L (ref 25–115)
ANION GAP SERPL CALC-SCNC: 18.5 MMOL/L (ref 8–16)
APPEARANCE UR: CLEAR
BASOPHILS NFR BLD AUTO: 0.2 % (ref 0–2)
BILIRUB SERPL-MCNC: 0.6 MG/DL (ref 0.2–1.3)
BILIRUB SERPL-MCNC: NEGATIVE MG/DL
BUN SERPL-MCNC: 63 MG/DL (ref 7–18)
CALCIUM SERPL-MCNC: 8.9 MG/DL (ref 8.5–10.1)
CHLORIDE SERPL-SCNC: 105 MMOL/L (ref 98–107)
CO2 SERPL-SCNC: 19 MMOL/L (ref 21–32)
COLOR UR: YELLOW
CREAT SERPL-MCNC: 5.4 MG/DL (ref 0.6–1.3)
EOSINOPHIL NFR BLD: 1.2 % (ref 0–7)
ERYTHROCYTE [DISTWIDTH] IN BLOOD BY AUTOMATED COUNT: 14.3 % (ref 11.5–14.5)
GLOBULIN SER-MCNC: 3.5 G/L
GLUCOSE SERPL-MCNC: 95 MG/DL (ref 74–106)
GLUCOSE SERPL-MCNC: NEGATIVE MG/DL
HCT VFR BLD CALC: 30.2 % (ref 42–54)
HGB BLD-MCNC: 11.2 G/DL (ref 13.5–17.5)
IMM GRANULOCYTES NFR BLD: 1.1 % (ref 0–5)
KETONES UR STRIP-MCNC: NEGATIVE MG/DL
LIPASE SERPL-CCNC: 134 U/L (ref 73–393)
LYMPHOCYTES NFR BLD AUTO: 3.6 % (ref 15–50)
MCH RBC QN AUTO: 35.6 PG (ref 26–34)
MCHC RBC AUTO-ENTMCNC: 37.1 G/DL (ref 31–37)
MCV RBC: 95.9 FL (ref 80–100)
MONOCYTES NFR BLD: 6.1 % (ref 2–11)
NEUTROPHILS NFR BLD AUTO: 87.8 % (ref 40–80)
NITRITE UR-MCNC: NEGATIVE MG/ML
OSMOLALITY SERPL CALC.SUM OF ELEC: 293 MOSM/KG (ref 275–300)
PH UR STRIP: 5 [PH] (ref 5–6)
PLATELET # BLD: 111 10X3/UL (ref 130–400)
PMV BLD AUTO: 9.6 FL (ref 7.4–10.4)
POTASSIUM SERPL-SCNC: 4.5 MMOL/L (ref 3.5–5.1)
PROT SERPL-MCNC: 6.3 G/DL (ref 6.4–8.2)
PROT UR-MCNC: (no result) MG/DL
RBC # BLD AUTO: 3.15 10X6/UL (ref 4.2–6.1)
SODIUM SERPL-SCNC: 138 MMOL/L (ref 136–145)
SP GR UR STRIP: 1.01 (ref 1–1.02)
UROBILINOGEN UR-MCNC: NORMAL MG/DL
WBC # BLD AUTO: 8.1 10X3/UL (ref 4.8–10.8)

## 2018-08-31 ENCOUNTER — HOSPITAL ENCOUNTER (EMERGENCY)
Dept: HOSPITAL 84 - D.ER | Age: 38
Discharge: HOME | End: 2018-08-31
Payer: MEDICARE

## 2018-08-31 VITALS — DIASTOLIC BLOOD PRESSURE: 90 MMHG | SYSTOLIC BLOOD PRESSURE: 141 MMHG

## 2018-08-31 VITALS — HEIGHT: 72 IN | BODY MASS INDEX: 29.86 KG/M2 | WEIGHT: 220.46 LBS

## 2018-08-31 DIAGNOSIS — N20.1: Primary | ICD-10-CM

## 2018-08-31 DIAGNOSIS — Z86.73: ICD-10-CM

## 2018-08-31 DIAGNOSIS — Z99.2: ICD-10-CM

## 2018-08-31 DIAGNOSIS — N18.4: ICD-10-CM

## 2018-08-31 DIAGNOSIS — I12.9: ICD-10-CM

## 2018-08-31 DIAGNOSIS — G40.909: ICD-10-CM

## 2018-08-31 LAB
ALBUMIN SERPL-MCNC: 3.9 G/DL (ref 3.4–5)
ALP SERPL-CCNC: 53 U/L (ref 46–116)
ALT SERPL-CCNC: 17 U/L (ref 10–68)
ANION GAP SERPL CALC-SCNC: 18.1 MMOL/L (ref 8–16)
APPEARANCE UR: CLEAR
BACTERIA #/AREA URNS HPF: (no result) /HPF
BASOPHILS NFR BLD AUTO: 0.2 % (ref 0–2)
BILIRUB SERPL-MCNC: 0.38 MG/DL (ref 0.2–1.3)
BILIRUB SERPL-MCNC: NEGATIVE MG/DL
BUN SERPL-MCNC: 39 MG/DL (ref 7–18)
CALCIUM SERPL-MCNC: 9.5 MG/DL (ref 8.5–10.1)
CHLORIDE SERPL-SCNC: 101 MMOL/L (ref 98–107)
CO2 SERPL-SCNC: 26.4 MMOL/L (ref 21–32)
COLOR UR: YELLOW
CREAT SERPL-MCNC: 7.4 MG/DL (ref 0.6–1.3)
EOSINOPHIL NFR BLD: 1.1 % (ref 0–7)
ERYTHROCYTE [DISTWIDTH] IN BLOOD BY AUTOMATED COUNT: 13.5 % (ref 11.5–14.5)
GLOBULIN SER-MCNC: 3.2 G/L
GLUCOSE SERPL-MCNC: 107 MG/DL (ref 74–106)
GLUCOSE SERPL-MCNC: NEGATIVE MG/DL
HCT VFR BLD CALC: 40.3 % (ref 42–54)
HGB BLD-MCNC: 14.3 G/DL (ref 13.5–17.5)
IMM GRANULOCYTES NFR BLD: 0.4 % (ref 0–5)
KETONES UR STRIP-MCNC: NEGATIVE MG/DL
LYMPHOCYTES NFR BLD AUTO: 10.2 % (ref 15–50)
MCH RBC QN AUTO: 32.9 PG (ref 26–34)
MCHC RBC AUTO-ENTMCNC: 35.5 G/DL (ref 31–37)
MCV RBC: 92.6 FL (ref 80–100)
MONOCYTES NFR BLD: 2.5 % (ref 2–11)
NEUTROPHILS NFR BLD AUTO: 85.6 % (ref 40–80)
NITRITE UR-MCNC: NEGATIVE MG/ML
OSMOLALITY SERPL CALC.SUM OF ELEC: 289 MOSM/KG (ref 275–300)
PH UR STRIP: 5 [PH] (ref 5–6)
PLATELET # BLD: 204 10X3/UL (ref 130–400)
PMV BLD AUTO: 9.8 FL (ref 7.4–10.4)
POTASSIUM SERPL-SCNC: 4.5 MMOL/L (ref 3.5–5.1)
PROT SERPL-MCNC: 7.1 G/DL (ref 6.4–8.2)
PROT UR-MCNC: (no result) MG/DL
RBC # BLD AUTO: 4.35 10X6/UL (ref 4.2–6.1)
SODIUM SERPL-SCNC: 141 MMOL/L (ref 136–145)
SP GR UR STRIP: 1.02 (ref 1–1.02)
SQUAMOUS #/AREA URNS HPF: (no result) /HPF (ref 0–5)
UROBILINOGEN UR-MCNC: NORMAL MG/DL
WBC # BLD AUTO: 13.9 10X3/UL (ref 4.8–10.8)
WBC #/AREA URNS HPF: (no result) /HPF (ref 0–5)

## 2018-10-09 ENCOUNTER — HOSPITAL ENCOUNTER (OUTPATIENT)
Dept: HOSPITAL 84 - D.OPS | Age: 38
LOS: 1 days | Discharge: HOME | End: 2018-10-10
Attending: SURGERY
Payer: MEDICARE

## 2018-10-09 VITALS — DIASTOLIC BLOOD PRESSURE: 84 MMHG | SYSTOLIC BLOOD PRESSURE: 151 MMHG

## 2018-10-09 VITALS
WEIGHT: 225.47 LBS | BODY MASS INDEX: 30.54 KG/M2 | BODY MASS INDEX: 30.54 KG/M2 | HEIGHT: 72 IN | WEIGHT: 225.47 LBS | BODY MASS INDEX: 30.54 KG/M2 | HEIGHT: 72 IN

## 2018-10-09 VITALS — SYSTOLIC BLOOD PRESSURE: 150 MMHG | DIASTOLIC BLOOD PRESSURE: 83 MMHG

## 2018-10-09 VITALS — SYSTOLIC BLOOD PRESSURE: 145 MMHG | DIASTOLIC BLOOD PRESSURE: 93 MMHG

## 2018-10-09 DIAGNOSIS — N18.6: ICD-10-CM

## 2018-10-09 DIAGNOSIS — Z01.812: ICD-10-CM

## 2018-10-09 DIAGNOSIS — Z99.2: ICD-10-CM

## 2018-10-09 DIAGNOSIS — T82.590A: Primary | ICD-10-CM

## 2018-10-09 LAB
ANION GAP SERPL CALC-SCNC: 19.4 MMOL/L (ref 8–16)
APTT BLD: 28.7 SECONDS (ref 22.8–39.4)
BASOPHILS NFR BLD AUTO: 0.3 % (ref 0–2)
BUN SERPL-MCNC: 45 MG/DL (ref 7–18)
CALCIUM SERPL-MCNC: 9.2 MG/DL (ref 8.5–10.1)
CHLORIDE SERPL-SCNC: 102 MMOL/L (ref 98–107)
CO2 SERPL-SCNC: 25.6 MMOL/L (ref 21–32)
CREAT SERPL-MCNC: 5.8 MG/DL (ref 0.6–1.3)
EOSINOPHIL NFR BLD: 1 % (ref 0–7)
ERYTHROCYTE [DISTWIDTH] IN BLOOD BY AUTOMATED COUNT: 13.6 % (ref 11.5–14.5)
GLUCOSE SERPL-MCNC: 98 MG/DL (ref 74–106)
HCT VFR BLD CALC: 39.1 % (ref 42–54)
HGB BLD-MCNC: 13.7 G/DL (ref 13.5–17.5)
IMM GRANULOCYTES NFR BLD: 0.4 % (ref 0–5)
INR PPP: 1.07 (ref 0.85–1.17)
LYMPHOCYTES NFR BLD AUTO: 5.3 % (ref 15–50)
MCH RBC QN AUTO: 32.7 PG (ref 26–34)
MCHC RBC AUTO-ENTMCNC: 35 G/DL (ref 31–37)
MCV RBC: 93.3 FL (ref 80–100)
MONOCYTES NFR BLD: 8.2 % (ref 2–11)
NEUTROPHILS NFR BLD AUTO: 84.8 % (ref 40–80)
OSMOLALITY SERPL CALC.SUM OF ELEC: 296 MOSM/KG (ref 275–300)
PLATELET # BLD: 192 10X3/UL (ref 130–400)
PMV BLD AUTO: 10.3 FL (ref 7.4–10.4)
POTASSIUM SERPL-SCNC: 4 MMOL/L (ref 3.5–5.1)
PROTHROMBIN TIME: 13.5 SECONDS (ref 11.6–15)
RBC # BLD AUTO: 4.19 10X6/UL (ref 4.2–6.1)
SODIUM SERPL-SCNC: 143 MMOL/L (ref 136–145)
WBC # BLD AUTO: 11.5 10X3/UL (ref 4.8–10.8)

## 2018-10-10 VITALS — DIASTOLIC BLOOD PRESSURE: 106 MMHG | SYSTOLIC BLOOD PRESSURE: 179 MMHG

## 2018-10-10 VITALS — SYSTOLIC BLOOD PRESSURE: 172 MMHG | DIASTOLIC BLOOD PRESSURE: 89 MMHG

## 2018-10-10 VITALS — DIASTOLIC BLOOD PRESSURE: 95 MMHG | SYSTOLIC BLOOD PRESSURE: 166 MMHG

## 2018-10-10 LAB
ANION GAP SERPL CALC-SCNC: 18.8 MMOL/L (ref 8–16)
BASOPHILS NFR BLD AUTO: 0.2 % (ref 0–2)
BUN SERPL-MCNC: 53 MG/DL (ref 7–18)
CALCIUM SERPL-MCNC: 8.9 MG/DL (ref 8.5–10.1)
CHLORIDE SERPL-SCNC: 102 MMOL/L (ref 98–107)
CO2 SERPL-SCNC: 21.5 MMOL/L (ref 21–32)
CREAT SERPL-MCNC: 7 MG/DL (ref 0.6–1.3)
EOSINOPHIL NFR BLD: 0.8 % (ref 0–7)
ERYTHROCYTE [DISTWIDTH] IN BLOOD BY AUTOMATED COUNT: 13.6 % (ref 11.5–14.5)
GLUCOSE SERPL-MCNC: 96 MG/DL (ref 74–106)
HCT VFR BLD CALC: 38.2 % (ref 42–54)
HGB BLD-MCNC: 13.1 G/DL (ref 13.5–17.5)
IMM GRANULOCYTES NFR BLD: 0.5 % (ref 0–5)
LYMPHOCYTES NFR BLD AUTO: 5.5 % (ref 15–50)
MCH RBC QN AUTO: 32.5 PG (ref 26–34)
MCHC RBC AUTO-ENTMCNC: 34.3 G/DL (ref 31–37)
MCV RBC: 94.8 FL (ref 80–100)
MONOCYTES NFR BLD: 12 % (ref 2–11)
NEUTROPHILS NFR BLD AUTO: 81 % (ref 40–80)
OSMOLALITY SERPL CALC.SUM OF ELEC: 289 MOSM/KG (ref 275–300)
PHOSPHATE SERPL-MCNC: 9.2 MG/DL (ref 2.5–4.9)
PLATELET # BLD: 172 10X3/UL (ref 130–400)
PMV BLD AUTO: 10.3 FL (ref 7.4–10.4)
POTASSIUM SERPL-SCNC: 4.3 MMOL/L (ref 3.5–5.1)
RBC # BLD AUTO: 4.03 10X6/UL (ref 4.2–6.1)
SODIUM SERPL-SCNC: 138 MMOL/L (ref 136–145)
WBC # BLD AUTO: 13.1 10X3/UL (ref 4.8–10.8)

## 2019-02-12 ENCOUNTER — HOSPITAL ENCOUNTER (OUTPATIENT)
Dept: HOSPITAL 84 - D.M2 | Age: 39
Setting detail: OBSERVATION
LOS: 1 days | Discharge: HOME | End: 2019-02-13
Attending: INTERNAL MEDICINE | Admitting: INTERNAL MEDICINE
Payer: MEDICARE

## 2019-02-12 VITALS
HEIGHT: 72 IN | HEIGHT: 72 IN | BODY MASS INDEX: 31.83 KG/M2 | WEIGHT: 235 LBS | WEIGHT: 235 LBS | BODY MASS INDEX: 31.83 KG/M2 | BODY MASS INDEX: 31.83 KG/M2

## 2019-02-12 VITALS — DIASTOLIC BLOOD PRESSURE: 76 MMHG | SYSTOLIC BLOOD PRESSURE: 150 MMHG

## 2019-02-12 VITALS — DIASTOLIC BLOOD PRESSURE: 75 MMHG | SYSTOLIC BLOOD PRESSURE: 121 MMHG

## 2019-02-12 DIAGNOSIS — E78.5: ICD-10-CM

## 2019-02-12 DIAGNOSIS — F41.9: ICD-10-CM

## 2019-02-12 DIAGNOSIS — T81.596A: Primary | ICD-10-CM

## 2019-02-12 DIAGNOSIS — N18.6: ICD-10-CM

## 2019-02-12 DIAGNOSIS — F32.9: ICD-10-CM

## 2019-02-12 DIAGNOSIS — Z86.73: ICD-10-CM
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
## 2019-02-12 DIAGNOSIS — K21.9: ICD-10-CM

## 2019-02-12 DIAGNOSIS — I12.0: ICD-10-CM

## 2019-02-12 DIAGNOSIS — M32.9: ICD-10-CM

## 2019-02-12 LAB
ANION GAP SERPL CALC-SCNC: 21 MMOL/L (ref 8–16)
BASOPHILS NFR BLD AUTO: 0.2 % (ref 0–2)
BUN SERPL-MCNC: 48 MG/DL (ref 7–18)
CALCIUM SERPL-MCNC: 8.6 MG/DL (ref 8.5–10.1)
CHLORIDE SERPL-SCNC: 100 MMOL/L (ref 98–107)
CO2 SERPL-SCNC: 23.8 MMOL/L (ref 21–32)
CREAT SERPL-MCNC: 6.9 MG/DL (ref 0.6–1.3)
EOSINOPHIL NFR BLD: 1.8 % (ref 0–7)
ERYTHROCYTE [DISTWIDTH] IN BLOOD BY AUTOMATED COUNT: 13.8 % (ref 11.5–14.5)
GLUCOSE SERPL-MCNC: 87 MG/DL (ref 74–106)
HCT VFR BLD CALC: 38.6 % (ref 42–54)
HGB BLD-MCNC: 13.2 G/DL (ref 13.5–17.5)
IMM GRANULOCYTES NFR BLD: 0.1 % (ref 0–5)
LYMPHOCYTES NFR BLD AUTO: 12.7 % (ref 15–50)
MCH RBC QN AUTO: 31.4 PG (ref 26–34)
MCHC RBC AUTO-ENTMCNC: 34.2 G/DL (ref 31–37)
MCV RBC: 91.9 FL (ref 80–100)
MONOCYTES NFR BLD: 8.5 % (ref 2–11)
NEUTROPHILS NFR BLD AUTO: 76.7 % (ref 40–80)
OSMOLALITY SERPL CALC.SUM OF ELEC: 292 MOSM/KG (ref 275–300)
PLATELET # BLD: 176 10X3/UL (ref 130–400)
PMV BLD AUTO: 10 FL (ref 7.4–10.4)
POTASSIUM SERPL-SCNC: 3.8 MMOL/L (ref 3.5–5.1)
RBC # BLD AUTO: 4.2 10X6/UL (ref 4.2–6.1)
SODIUM SERPL-SCNC: 141 MMOL/L (ref 136–145)
WBC # BLD AUTO: 8.1 10X3/UL (ref 4.8–10.8)

## 2019-02-12 NOTE — NUR
INTRODUCED SELF TO PATIENT, PATIENT HAD FRIEND AT BEDSIDE. PATIENT WATCHING TV
AND EATING PIZZA. RESP EVEN AND UNLABORED. NO S/SX OF PAIN AT THIS TIME. BED
IN LOWEST POSITION, CALL LIGHT IN REACH.

## 2019-02-13 VITALS — DIASTOLIC BLOOD PRESSURE: 72 MMHG | SYSTOLIC BLOOD PRESSURE: 138 MMHG

## 2019-02-13 VITALS — SYSTOLIC BLOOD PRESSURE: 105 MMHG | DIASTOLIC BLOOD PRESSURE: 69 MMHG

## 2019-02-13 LAB
ANION GAP SERPL CALC-SCNC: 21.9 MMOL/L (ref 8–16)
BASOPHILS NFR BLD AUTO: 0.4 % (ref 0–2)
BUN SERPL-MCNC: 57 MG/DL (ref 7–18)
CALCIUM SERPL-MCNC: 8.6 MG/DL (ref 8.5–10.1)
CHLORIDE SERPL-SCNC: 101 MMOL/L (ref 98–107)
CO2 SERPL-SCNC: 21.9 MMOL/L (ref 21–32)
CREAT SERPL-MCNC: 7.4 MG/DL (ref 0.6–1.3)
EOSINOPHIL NFR BLD: 2.4 % (ref 0–7)
ERYTHROCYTE [DISTWIDTH] IN BLOOD BY AUTOMATED COUNT: 13.9 % (ref 11.5–14.5)
GLUCOSE SERPL-MCNC: 93 MG/DL (ref 74–106)
HCT VFR BLD CALC: 39.1 % (ref 42–54)
HGB BLD-MCNC: 13.2 G/DL (ref 13.5–17.5)
IMM GRANULOCYTES NFR BLD: 0.4 % (ref 0–5)
LYMPHOCYTES NFR BLD AUTO: 14.1 % (ref 15–50)
MCH RBC QN AUTO: 31.2 PG (ref 26–34)
MCHC RBC AUTO-ENTMCNC: 33.8 G/DL (ref 31–37)
MCV RBC: 92.4 FL (ref 80–100)
MONOCYTES NFR BLD: 8.6 % (ref 2–11)
NEUTROPHILS NFR BLD AUTO: 74.1 % (ref 40–80)
OSMOLALITY SERPL CALC.SUM OF ELEC: 296 MOSM/KG (ref 275–300)
PLATELET # BLD: 179 10X3/UL (ref 130–400)
PMV BLD AUTO: 11.2 FL (ref 7.4–10.4)
POTASSIUM SERPL-SCNC: 3.8 MMOL/L (ref 3.5–5.1)
RBC # BLD AUTO: 4.23 10X6/UL (ref 4.2–6.1)
SODIUM SERPL-SCNC: 141 MMOL/L (ref 136–145)
WBC # BLD AUTO: 7.8 10X3/UL (ref 4.8–10.8)

## 2019-02-13 NOTE — MORECARE
CASE MANAGEMENT DISCHARGE SUMMARY
 
 
PATIENT: AFRICA GARRETT                 UNIT: [id number]
ACCOUNT#: [id number]                       ADM DATE: 19
AGE: 38     : [birth year]  SEX: M            ROOM/BED: D.2138    
AUTHOR: KRANTHI VALDES                             PHYSICIAN:                               
 
REFERRING PHYSICIAN: FERNANDO BAIN MD             
DATE OF SERVICE: 19
Discharge Plan
 
 
Patient Name: AFRICA GARRETT
Facility: [facility]:[place]
Encounter #: [id number]
Medical Record #: [id number]
: 1980
Planned Disposition: 
Anticipated Discharge Date: 
 
Discharge Date: 
Expected LOS: 
Initial Reviewer: [id number]
Initial Review Date: 2019
Generated: 19   2:00 pm 
  
 
 
 
 
 
 
Coverage Notice
 
Reviewer: [id number] - Hiral Coleman
 
Notice Issued Date-Time: 2019  12:42
Notice Type: Medicare Outpatient Observation Notice
 
Notice Delivered To: Patient
Relationship to Patient: Self
Representative Name: 
 
Delivery Method: HAND - Hand Delivered
Ludy Days:
Prior Verbal Notification: 
 
Recipient Understood Notice: Yes
Recipient Signature: Yes
 
Med Rec Note Co-signed by Attending:
 
Coverage Notice Comment:  LIAO DELIVERED AND DISCUSSED WITH PATIENT AND HIS 
WIFE, KURTIS, AFTER VERBAL CONSENT OBTAINED. PATIENT HAD JUST RECEIVED SOME IV 
MEDICATION AND WAS UNABLE TO KEEP HEAD ERECT AND REQUESTED THAT HIS WIFE SIGN 
THE PAPERS.
Patient Name: AFRICA GARRETT
Encounter #: [id number]
Page [id number]
 
 
 
 
 
Electronically Signed by KRANTHI VALDES on 19 at 1300
 
 
 
 
 
 
**All edits/amendments must be made on the electronic document**
 
DICTATION DATE: 19 1300     : FIGUEROA  19 1300     
RPT#: [id number]                                DC DATE:        
                                               STATUS: ADM IN  
[facility]
[address] [place] [address]
***END OF REPORT***

## 2019-02-13 NOTE — NUR
PATIENT IS RESTING AT THIS TIME, APPEARS NAUSEA FREE. WIFE AT BEDSIDE.
DISCHARGE PAPERWORK IS DONE, JUST WAITING ON PAIIENT TO FEEL BETTER FOR
DISCHARGE.

## 2019-02-13 NOTE — NUR
CALLED TO ROOM PER PATIENT WIFE AS HE IS STILL VERY NAUSEATED. PHENERGAN
GIVEN. THERE ARE TWO DIALYSIS NEEDLES LAYING ON THE FLOOR NEXT TO BED. PATIENT
STATES THAT HE FOUND THESE IN HIS BED POST DIALYSIS AND DROPPED THEM ONTO THE
FLOOR. I ASKED VIRGINIA LIANG RN UNIT MANAGER TO COME SEE THEM AS THEY LAID.
PLACED IN BIO HAZARD BAG AFTER SHE SAW THEM AND PLACED THEM IN HER OFFICE.

## 2019-02-13 NOTE — NUR
0925-RETURNS FROM RECOVERY ROOM WITH SALINE LOCK PIV TO RIGHT AC. WIFE AT
BEDSIDE. TWO SMALL DRESSINGS SEEN TO LEFT FA. + BRUIT AND THRILL. EATING
BREAKFAST, DENIES NEEDS AT THIS TIME.

## 2019-02-13 NOTE — NUR
CALLED TO ROOM FOR SALINE LOCK REMOVAL. REMOVED WITH CATH TIP INTACT. STATES
THAT HE IS FEELING SOME BETTER.

## 2019-02-13 NOTE — NUR
NOTIFIED SURGERY ABOUT MEDS ALREADY BRINGING DRAWN UP AND THEY SAID TO LEAVE
TRANFER MEDS LABELED WITH PT. LABELED SYRINGES AND LEFT MEDS AT BEDSIDE AND
REPORTED TO LEILA MCCLURE. WILL COMPLETE PRE-OP LIST PER LEILA MCCLURE TO BEST OF MY
ABILITY AS I RECIEVED PT AT 0430 THIS MORNING FROM LEILA SOARES.

## 2019-02-13 NOTE — NUR
RECIEVED CALL FROM OR AT 0610 TO PRE-OP PT. WAS TOLD BY OFF-GOING NURSE
LEILA SOARES THAT PT DOES NOT HAVE IV BECAUSE "SURGERY IS SUPPOSED TO DO IT."
MEDICATIONS DRAWN UP READY TO ADMINSTER, BUT COULD NOT GET IV ACCESS, X2
STICKS. WILL HAND OFF TO ONCOMING NURSE AND NOTIFY SURGERY.

## 2019-02-13 NOTE — NUR
ASSUMING CARE OF PT - RECIEVED REPORT OF LEILA SOARES. PT RESTING IN BED WITH
EYES CLOSED. RR EVEN AND UL, NO S/S OF DISTRESS. VSS. WCTM AND FOLLOW POC. CL
IN REACH, SR UP X2,BED IN LOWEST POSITION. NPO STATUS MAINTAINED.

## 2019-02-15 ENCOUNTER — HOSPITAL ENCOUNTER (INPATIENT)
Dept: HOSPITAL 84 - D.ER | Age: 39
LOS: 6 days | Discharge: HOME | DRG: 252 | End: 2019-02-21
Attending: INTERNAL MEDICINE | Admitting: INTERNAL MEDICINE
Payer: MEDICARE

## 2019-02-15 VITALS
WEIGHT: 231.49 LBS | BODY MASS INDEX: 31.35 KG/M2 | BODY MASS INDEX: 31.35 KG/M2 | HEIGHT: 72 IN | BODY MASS INDEX: 31.35 KG/M2 | WEIGHT: 231.49 LBS | HEIGHT: 72 IN

## 2019-02-15 VITALS — SYSTOLIC BLOOD PRESSURE: 149 MMHG | DIASTOLIC BLOOD PRESSURE: 88 MMHG

## 2019-02-15 DIAGNOSIS — F41.9: ICD-10-CM

## 2019-02-15 DIAGNOSIS — T82.858A: Primary | ICD-10-CM

## 2019-02-15 DIAGNOSIS — D58.9: ICD-10-CM

## 2019-02-15 DIAGNOSIS — Z86.73: ICD-10-CM

## 2019-02-15 DIAGNOSIS — Z87.891: ICD-10-CM

## 2019-02-15 DIAGNOSIS — M32.14: ICD-10-CM
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
## 2019-02-15 DIAGNOSIS — N20.1: ICD-10-CM

## 2019-02-15 DIAGNOSIS — K21.9: ICD-10-CM

## 2019-02-15 DIAGNOSIS — E78.5: ICD-10-CM

## 2019-02-15 DIAGNOSIS — I12.0: ICD-10-CM

## 2019-02-15 DIAGNOSIS — Z99.2: ICD-10-CM

## 2019-02-15 DIAGNOSIS — F32.9: ICD-10-CM

## 2019-02-15 DIAGNOSIS — N18.6: ICD-10-CM

## 2019-02-15 LAB
ALBUMIN SERPL-MCNC: 3.8 G/DL (ref 3.4–5)
ALP SERPL-CCNC: 58 U/L (ref 46–116)
ALT SERPL-CCNC: 8 U/L (ref 10–68)
ANION GAP SERPL CALC-SCNC: 21.8 MMOL/L (ref 8–16)
APTT BLD: 28.2 SECONDS (ref 22.8–39.4)
BASOPHILS NFR BLD AUTO: 0.1 % (ref 0–2)
BILIRUB SERPL-MCNC: 0.3 MG/DL (ref 0.2–1.3)
BUN SERPL-MCNC: 67 MG/DL (ref 7–18)
CALCIUM SERPL-MCNC: 8.7 MG/DL (ref 8.5–10.1)
CHLORIDE SERPL-SCNC: 100 MMOL/L (ref 98–107)
CO2 SERPL-SCNC: 22.2 MMOL/L (ref 21–32)
CREAT SERPL-MCNC: 8.5 MG/DL (ref 0.6–1.3)
EOSINOPHIL NFR BLD: 0.1 % (ref 0–7)
ERYTHROCYTE [DISTWIDTH] IN BLOOD BY AUTOMATED COUNT: 13.6 % (ref 11.5–14.5)
GLOBULIN SER-MCNC: 2.9 G/L
GLUCOSE SERPL-MCNC: 120 MG/DL (ref 74–106)
HCT VFR BLD CALC: 37.5 % (ref 42–54)
HGB BLD-MCNC: 13.1 G/DL (ref 13.5–17.5)
IMM GRANULOCYTES NFR BLD: 0.4 % (ref 0–5)
INR PPP: 1.11 (ref 0.85–1.17)
LYMPHOCYTES NFR BLD AUTO: 3.2 % (ref 15–50)
MCH RBC QN AUTO: 31.6 PG (ref 26–34)
MCHC RBC AUTO-ENTMCNC: 34.9 G/DL (ref 31–37)
MCV RBC: 90.6 FL (ref 80–100)
MONOCYTES NFR BLD: 2.5 % (ref 2–11)
NEUTROPHILS NFR BLD AUTO: 93.7 % (ref 40–80)
OSMOLALITY SERPL CALC.SUM OF ELEC: 297 MOSM/KG (ref 275–300)
PLATELET # BLD: 169 10X3/UL (ref 130–400)
PMV BLD AUTO: 10.6 FL (ref 7.4–10.4)
POTASSIUM SERPL-SCNC: 5 MMOL/L (ref 3.5–5.1)
PROT SERPL-MCNC: 6.7 G/DL (ref 6.4–8.2)
PROTHROMBIN TIME: 13.8 SECONDS (ref 11.6–15)
RBC # BLD AUTO: 4.14 10X6/UL (ref 4.2–6.1)
SODIUM SERPL-SCNC: 139 MMOL/L (ref 136–145)
WBC # BLD AUTO: 13.4 10X3/UL (ref 4.8–10.8)

## 2019-02-15 PROCEDURE — B51W1ZZ FLUOROSCOPY OF DIALYSIS SHUNT/FISTULA USING LOW OSMOLAR CONTRAST: ICD-10-PCS | Performed by: SURGERY

## 2019-02-15 PROCEDURE — 037Y3ZZ DILATION OF UPPER ARTERY, PERCUTANEOUS APPROACH: ICD-10-PCS | Performed by: SURGERY

## 2019-02-15 PROCEDURE — 05C83ZZ EXTIRPATION OF MATTER FROM LEFT AXILLARY VEIN, PERCUTANEOUS APPROACH: ICD-10-PCS | Performed by: SURGERY

## 2019-02-15 NOTE — MORECARE
CASE MANAGEMENT DISCHARGE SUMMARY
 
 
PATIENT: AFRICA GARRETT                 UNIT: [id number]
ACCOUNT#: [id number]                       ADM DATE: 19
AGE: 38     : [birth year]  SEX: M            ROOM/BED: D.2137    
AUTHOR: KRANTHI VALDES                             PHYSICIAN:                               
 
REFERRING PHYSICIAN: FERNANDO BAIN MD             
DATE OF SERVICE: 02/15/19
Discharge Plan
 
 
Patient Name: AFRICA GARRETT
Facility: [facility]:[place]
Encounter #: [id number]
Medical Record #: [id number]
: 1980
Planned Disposition: Home
Anticipated Discharge Date: 19
 
Discharge Date: 2019
Expected LOS: 1
Initial Reviewer: [email or web address]
Initial Review Date: 2019
Generated: 2/15/19   8:52 am 
  
 
 
 
 
 
 
Coverage Notice
 
Reviewer: [email or web address] Emir Coleman
 
Notice Issued Date-Time: 2019  12:42
Notice Type: Medicare Outpatient Observation Notice
 
Notice Delivered To: Patient
Relationship to Patient: Self
Representative Name: 
 
Delivery Method: HAND - Hand Delivered
Ludy Days:
Prior Verbal Notification: 
 
Recipient Understood Notice: Yes
Recipient Signature: Yes
 
Med Rec Note Co-signed by Attending:
 
Coverage Notice Comment:  LIAO DELIVERED AND DISCUSSED WITH PATIENT AND HIS 
WIFE, KURTIS, AFTER VERBAL CONSENT OBTAINED. PATIENT HAD JUST RECEIVED SOME IV 
MEDICATION AND WAS UNABLE TO KEEP HEAD ERECT AND REQUESTED THAT HIS WIFE SIGN 
THE PAPERS.
 
Last DP export: 19  12:00 p
Patient Name: AFRICA GARRETT
Encounter #: [id number]
Page [id number]
 
 
 
 
 
Electronically Signed by KRANTHI VALDES on 02/15/19 at 0752
 
 
 
 
 
 
**All edits/amendments must be made on the electronic document**
 
DICTATION DATE: 02/15/19 075     : FIGUEROA  02/15/19 0751     
RPT#: [id number]                                DC DATE:19
                                               STATUS: DIS IN  
[facility]
[address] [place] [address]
***END OF REPORT***

## 2019-02-16 VITALS — DIASTOLIC BLOOD PRESSURE: 87 MMHG | SYSTOLIC BLOOD PRESSURE: 153 MMHG

## 2019-02-16 VITALS — DIASTOLIC BLOOD PRESSURE: 93 MMHG | SYSTOLIC BLOOD PRESSURE: 152 MMHG

## 2019-02-16 VITALS — DIASTOLIC BLOOD PRESSURE: 95 MMHG | SYSTOLIC BLOOD PRESSURE: 172 MMHG

## 2019-02-16 VITALS — SYSTOLIC BLOOD PRESSURE: 164 MMHG | DIASTOLIC BLOOD PRESSURE: 87 MMHG

## 2019-02-16 VITALS — DIASTOLIC BLOOD PRESSURE: 77 MMHG | SYSTOLIC BLOOD PRESSURE: 150 MMHG

## 2019-02-16 VITALS — SYSTOLIC BLOOD PRESSURE: 131 MMHG | DIASTOLIC BLOOD PRESSURE: 80 MMHG

## 2019-02-16 LAB
ALBUMIN SERPL-MCNC: 3.2 G/DL (ref 3.4–5)
ALP SERPL-CCNC: 51 U/L (ref 46–116)
ALT SERPL-CCNC: 26 U/L (ref 10–68)
ANION GAP SERPL CALC-SCNC: 19.2 MMOL/L (ref 8–16)
BASOPHILS NFR BLD AUTO: 0.1 % (ref 0–2)
BILIRUB DIRECT SERPL-MCNC: 0.05 MG/DL (ref 0–0.3)
BILIRUB INDIRECT SERPL-MCNC: 1.17 MG/DL (ref 0–1)
BILIRUB SERPL-MCNC: 1.22 MG/DL (ref 0.2–1.3)
BUN SERPL-MCNC: 55 MG/DL (ref 7–18)
CALCIUM SERPL-MCNC: 8.4 MG/DL (ref 8.5–10.1)
CHLORIDE SERPL-SCNC: 98 MMOL/L (ref 98–107)
CK SERPL-CCNC: 120 UL (ref 21–232)
CO2 SERPL-SCNC: 22.5 MMOL/L (ref 21–32)
CREAT SERPL-MCNC: 6.4 MG/DL (ref 0.6–1.3)
EOSINOPHIL NFR BLD: 0.1 % (ref 0–7)
ERYTHROCYTE [DISTWIDTH] IN BLOOD BY AUTOMATED COUNT: 13.8 % (ref 11.5–14.5)
GLOBULIN SER-MCNC: 2.9 G/L
GLUCOSE SERPL-MCNC: 141 MG/DL (ref 74–106)
HCT VFR BLD CALC: 27 % (ref 42–54)
HGB BLD-MCNC: 10 G/DL (ref 13.5–17.5)
HGB BLD-MCNC: 10.6 G/DL (ref 13.5–17.5)
IMM GRANULOCYTES NFR BLD: 0.4 % (ref 0–5)
INR PPP: 1.18 (ref 0.85–1.17)
LYMPHOCYTES NFR BLD AUTO: 2.5 % (ref 15–50)
MCH RBC QN AUTO: 34 PG (ref 26–34)
MCHC RBC AUTO-ENTMCNC: 37 G/DL (ref 31–37)
MCV RBC: 91.8 FL (ref 80–100)
MONOCYTES NFR BLD: 9.2 % (ref 2–11)
NEUTROPHILS NFR BLD AUTO: 87.7 % (ref 40–80)
OSMOLALITY SERPL CALC.SUM OF ELEC: 288 MOSM/KG (ref 275–300)
PLATELET # BLD: 169 10X3/UL (ref 130–400)
PMV BLD AUTO: 11.5 FL (ref 7.4–10.4)
POTASSIUM SERPL-SCNC: 3.7 MMOL/L (ref 3.5–5.1)
PROT SERPL-MCNC: 6.1 G/DL (ref 6.4–8.2)
PROTHROMBIN TIME: 14.4 SECONDS (ref 11.6–15)
RBC # BLD AUTO: 2.94 10X6/UL (ref 4.2–6.1)
SODIUM SERPL-SCNC: 136 MMOL/L (ref 136–145)
VANCOMYCIN SERPL-MCNC: 0.5 UG/ML (ref 10–20)
WBC # BLD AUTO: 16.1 10X3/UL (ref 4.8–10.8)

## 2019-02-16 PROCEDURE — 5A1D70Z PERFORMANCE OF URINARY FILTRATION, INTERMITTENT, LESS THAN 6 HOURS PER DAY: ICD-10-PCS | Performed by: INTERNAL MEDICINE

## 2019-02-17 VITALS — DIASTOLIC BLOOD PRESSURE: 97 MMHG | SYSTOLIC BLOOD PRESSURE: 175 MMHG

## 2019-02-17 VITALS — DIASTOLIC BLOOD PRESSURE: 93 MMHG | SYSTOLIC BLOOD PRESSURE: 139 MMHG

## 2019-02-17 VITALS — DIASTOLIC BLOOD PRESSURE: 68 MMHG | SYSTOLIC BLOOD PRESSURE: 120 MMHG

## 2019-02-17 VITALS — DIASTOLIC BLOOD PRESSURE: 92 MMHG | SYSTOLIC BLOOD PRESSURE: 143 MMHG

## 2019-02-17 VITALS — DIASTOLIC BLOOD PRESSURE: 87 MMHG | SYSTOLIC BLOOD PRESSURE: 165 MMHG

## 2019-02-17 LAB
ALBUMIN SERPL-MCNC: 3.2 G/DL (ref 3.4–5)
ALP SERPL-CCNC: 55 U/L (ref 46–116)
ALT SERPL-CCNC: 10 U/L (ref 10–68)
ANION GAP SERPL CALC-SCNC: 18.8 MMOL/L (ref 8–16)
BASOPHILS NFR BLD AUTO: 0.1 % (ref 0–2)
BILIRUB DIRECT SERPL-MCNC: 0.14 MG/DL (ref 0–0.3)
BILIRUB INDIRECT SERPL-MCNC: 0.6 MG/DL (ref 0–1)
BILIRUB SERPL-MCNC: 0.74 MG/DL (ref 0.2–1.3)
BUN SERPL-MCNC: 73 MG/DL (ref 7–18)
CALCIUM SERPL-MCNC: 8.7 MG/DL (ref 8.5–10.1)
CHLORIDE SERPL-SCNC: 98 MMOL/L (ref 98–107)
CO2 SERPL-SCNC: 23.5 MMOL/L (ref 21–32)
CREAT SERPL-MCNC: 8.5 MG/DL (ref 0.6–1.3)
EOSINOPHIL NFR BLD: 0.8 % (ref 0–7)
ERYTHROCYTE [DISTWIDTH] IN BLOOD BY AUTOMATED COUNT: 14.1 % (ref 11.5–14.5)
GLOBULIN SER-MCNC: 2.8 G/L
GLUCOSE SERPL-MCNC: 101 MG/DL (ref 74–106)
HCT VFR BLD CALC: 29.2 % (ref 42–54)
HGB BLD-MCNC: 10.2 G/DL (ref 13.5–17.5)
IMM GRANULOCYTES NFR BLD: 0.7 % (ref 0–5)
LYMPHOCYTES NFR BLD AUTO: 6.5 % (ref 15–50)
MCH RBC QN AUTO: 32 PG (ref 26–34)
MCHC RBC AUTO-ENTMCNC: 34.9 G/DL (ref 31–37)
MCV RBC: 91.5 FL (ref 80–100)
MONOCYTES NFR BLD: 10.5 % (ref 2–11)
NEUTROPHILS NFR BLD AUTO: 81.4 % (ref 40–80)
OSMOLALITY SERPL CALC.SUM OF ELEC: 295 MOSM/KG (ref 275–300)
PLATELET # BLD: 168 10X3/UL (ref 130–400)
PMV BLD AUTO: 11 FL (ref 7.4–10.4)
POTASSIUM SERPL-SCNC: 3.3 MMOL/L (ref 3.5–5.1)
PROT SERPL-MCNC: 6 G/DL (ref 6.4–8.2)
RBC # BLD AUTO: 3.19 10X6/UL (ref 4.2–6.1)
SODIUM SERPL-SCNC: 137 MMOL/L (ref 136–145)
VANCOMYCIN SERPL-MCNC: 0 UG/ML (ref 10–20)
WBC # BLD AUTO: 14.4 10X3/UL (ref 4.8–10.8)

## 2019-02-18 VITALS — DIASTOLIC BLOOD PRESSURE: 82 MMHG | SYSTOLIC BLOOD PRESSURE: 138 MMHG

## 2019-02-18 VITALS — SYSTOLIC BLOOD PRESSURE: 134 MMHG | DIASTOLIC BLOOD PRESSURE: 83 MMHG

## 2019-02-18 VITALS — SYSTOLIC BLOOD PRESSURE: 143 MMHG | DIASTOLIC BLOOD PRESSURE: 90 MMHG

## 2019-02-18 VITALS — SYSTOLIC BLOOD PRESSURE: 147 MMHG | DIASTOLIC BLOOD PRESSURE: 79 MMHG

## 2019-02-18 VITALS — SYSTOLIC BLOOD PRESSURE: 141 MMHG | DIASTOLIC BLOOD PRESSURE: 88 MMHG

## 2019-02-18 LAB
AMORPHOUS SEDIMENT: (no result) /LPF
ANION GAP SERPL CALC-SCNC: 16.7 MMOL/L (ref 8–16)
APPEARANCE UR: (no result)
BACTERIA #/AREA URNS HPF: (no result) /HPF
BASOPHILS NFR BLD AUTO: 0.2 % (ref 0–2)
BILIRUB SERPL-MCNC: NEGATIVE MG/DL
BUN SERPL-MCNC: 60 MG/DL (ref 7–18)
CALCIUM SERPL-MCNC: 8.4 MG/DL (ref 8.5–10.1)
CHLORIDE SERPL-SCNC: 100 MMOL/L (ref 98–107)
CO2 SERPL-SCNC: 26.2 MMOL/L (ref 21–32)
COLOR UR: (no result)
CREAT SERPL-MCNC: 7.4 MG/DL (ref 0.6–1.3)
EOSINOPHIL NFR BLD: 0.8 % (ref 0–7)
ERYTHROCYTE [DISTWIDTH] IN BLOOD BY AUTOMATED COUNT: 14.1 % (ref 11.5–14.5)
GLUCOSE SERPL-MCNC: 50 MG/DL
GLUCOSE SERPL-MCNC: 79 MG/DL (ref 74–106)
HCT VFR BLD CALC: 29.3 % (ref 42–54)
HGB BLD-MCNC: 10.2 G/DL (ref 13.5–17.5)
IMM GRANULOCYTES NFR BLD: 0.8 % (ref 0–5)
KETONES UR STRIP-MCNC: NEGATIVE MG/DL
LYMPHOCYTES NFR BLD AUTO: 6.3 % (ref 15–50)
MCH RBC QN AUTO: 31.8 PG (ref 26–34)
MCHC RBC AUTO-ENTMCNC: 34.8 G/DL (ref 31–37)
MCV RBC: 91.3 FL (ref 80–100)
MONOCYTES NFR BLD: 10.5 % (ref 2–11)
MUCOUS THREADS #/AREA URNS LPF: (no result) /LPF
NEUTROPHILS NFR BLD AUTO: 81.4 % (ref 40–80)
NITRITE UR-MCNC: NEGATIVE MG/ML
OSMOLALITY SERPL CALC.SUM OF ELEC: 293 MOSM/KG (ref 275–300)
PH UR STRIP: 8 [PH] (ref 5–6)
PLATELET # BLD: 176 10X3/UL (ref 130–400)
PMV BLD AUTO: 11 FL (ref 7.4–10.4)
POTASSIUM SERPL-SCNC: 3.9 MMOL/L (ref 3.5–5.1)
PROT UR-MCNC: (no result) MG/DL
RBC # BLD AUTO: 3.21 10X6/UL (ref 4.2–6.1)
RBC #/AREA URNS HPF: (no result) /HPF (ref 0–5)
SODIUM SERPL-SCNC: 139 MMOL/L (ref 136–145)
SP GR UR STRIP: 1 (ref 1–1.02)
SQUAMOUS #/AREA URNS HPF: (no result) /HPF (ref 0–5)
UROBILINOGEN UR-MCNC: NORMAL MG/DL
VANCOMYCIN SERPL-MCNC: 0.3 UG/ML (ref 10–20)
WBC # BLD AUTO: 12.9 10X3/UL (ref 4.8–10.8)
WBC #/AREA URNS HPF: (no result) /HPF (ref 0–5)

## 2019-02-19 VITALS — DIASTOLIC BLOOD PRESSURE: 64 MMHG | SYSTOLIC BLOOD PRESSURE: 126 MMHG

## 2019-02-19 VITALS — SYSTOLIC BLOOD PRESSURE: 136 MMHG | DIASTOLIC BLOOD PRESSURE: 86 MMHG

## 2019-02-19 VITALS — SYSTOLIC BLOOD PRESSURE: 130 MMHG | DIASTOLIC BLOOD PRESSURE: 70 MMHG

## 2019-02-19 VITALS — DIASTOLIC BLOOD PRESSURE: 86 MMHG | SYSTOLIC BLOOD PRESSURE: 131 MMHG

## 2019-02-19 VITALS — DIASTOLIC BLOOD PRESSURE: 65 MMHG | SYSTOLIC BLOOD PRESSURE: 149 MMHG

## 2019-02-19 VITALS — SYSTOLIC BLOOD PRESSURE: 128 MMHG | DIASTOLIC BLOOD PRESSURE: 73 MMHG

## 2019-02-19 VITALS — SYSTOLIC BLOOD PRESSURE: 122 MMHG | DIASTOLIC BLOOD PRESSURE: 69 MMHG

## 2019-02-19 VITALS — SYSTOLIC BLOOD PRESSURE: 153 MMHG | DIASTOLIC BLOOD PRESSURE: 79 MMHG

## 2019-02-19 VITALS — SYSTOLIC BLOOD PRESSURE: 143 MMHG | DIASTOLIC BLOOD PRESSURE: 88 MMHG

## 2019-02-19 VITALS — SYSTOLIC BLOOD PRESSURE: 130 MMHG | DIASTOLIC BLOOD PRESSURE: 71 MMHG

## 2019-02-19 VITALS — SYSTOLIC BLOOD PRESSURE: 130 MMHG | DIASTOLIC BLOOD PRESSURE: 74 MMHG

## 2019-02-19 LAB
ANION GAP SERPL CALC-SCNC: 15.4 MMOL/L (ref 8–16)
APTT BLD: 32.9 SECONDS (ref 22.8–39.4)
BASOPHILS NFR BLD AUTO: 0.1 % (ref 0–2)
BUN SERPL-MCNC: 56 MG/DL (ref 7–18)
CALCIUM SERPL-MCNC: 8.1 MG/DL (ref 8.5–10.1)
CHLORIDE SERPL-SCNC: 100 MMOL/L (ref 98–107)
CO2 SERPL-SCNC: 28.4 MMOL/L (ref 21–32)
CREAT SERPL-MCNC: 8.3 MG/DL (ref 0.6–1.3)
EOSINOPHIL NFR BLD: 0.9 % (ref 0–7)
EPO SERPL-ACNC: 4.2 MIU/ML (ref 2.6–18.5)
ERYTHROCYTE [DISTWIDTH] IN BLOOD BY AUTOMATED COUNT: 14.1 % (ref 11.5–14.5)
GLUCOSE SERPL-MCNC: 82 MG/DL (ref 74–106)
HAPTOGLOB SERPL-MCNC: 18 MG/DL (ref 34–200)
HCT VFR BLD CALC: 28.2 % (ref 42–54)
HGB BLD-MCNC: 9.5 G/DL (ref 13.5–17.5)
IMM GRANULOCYTES NFR BLD: 0.8 % (ref 0–5)
INR PPP: 1.11 (ref 0.85–1.17)
LYMPHOCYTES NFR BLD AUTO: 10.2 % (ref 15–50)
MCH RBC QN AUTO: 31.6 PG (ref 26–34)
MCHC RBC AUTO-ENTMCNC: 33.7 G/DL (ref 31–37)
MCV RBC: 93.7 FL (ref 80–100)
MONOCYTES NFR BLD: 8.6 % (ref 2–11)
NEUTROPHILS NFR BLD AUTO: 79.4 % (ref 40–80)
OSMOLALITY SERPL CALC.SUM OF ELEC: 293 MOSM/KG (ref 275–300)
PLATELET # BLD: 179 10X3/UL (ref 130–400)
PMV BLD AUTO: 11.2 FL (ref 7.4–10.4)
POTASSIUM SERPL-SCNC: 3.8 MMOL/L (ref 3.5–5.1)
PROTHROMBIN TIME: 13.8 SECONDS (ref 11.6–15)
RBC # BLD AUTO: 3.01 10X6/UL (ref 4.2–6.1)
SODIUM SERPL-SCNC: 140 MMOL/L (ref 136–145)
VANCOMYCIN SERPL-MCNC: 0.5 UG/ML (ref 10–20)
WBC # BLD AUTO: 9.7 10X3/UL (ref 4.8–10.8)

## 2019-02-19 PROCEDURE — 07DR3ZX EXTRACTION OF ILIAC BONE MARROW, PERCUTANEOUS APPROACH, DIAGNOSTIC: ICD-10-PCS | Performed by: RADIOLOGY

## 2019-02-20 VITALS — SYSTOLIC BLOOD PRESSURE: 142 MMHG | DIASTOLIC BLOOD PRESSURE: 85 MMHG

## 2019-02-20 VITALS — DIASTOLIC BLOOD PRESSURE: 72 MMHG | SYSTOLIC BLOOD PRESSURE: 144 MMHG

## 2019-02-20 VITALS — SYSTOLIC BLOOD PRESSURE: 134 MMHG | DIASTOLIC BLOOD PRESSURE: 77 MMHG

## 2019-02-20 VITALS — SYSTOLIC BLOOD PRESSURE: 136 MMHG | DIASTOLIC BLOOD PRESSURE: 64 MMHG

## 2019-02-20 LAB
ANION GAP SERPL CALC-SCNC: 20.8 MMOL/L (ref 8–16)
BASOPHILS NFR BLD AUTO: 0.2 % (ref 0–2)
BUN SERPL-MCNC: 70 MG/DL (ref 7–18)
CALCIUM SERPL-MCNC: 7.8 MG/DL (ref 8.5–10.1)
CHLORIDE SERPL-SCNC: 99 MMOL/L (ref 98–107)
CO2 SERPL-SCNC: 23 MMOL/L (ref 21–32)
CREAT SERPL-MCNC: 9.7 MG/DL (ref 0.6–1.3)
DEPRECATED HGB OTHER BLD-IMP: 0 %
EOSINOPHIL NFR BLD: 1.6 % (ref 0–7)
ERYTHROCYTE [DISTWIDTH] IN BLOOD BY AUTOMATED COUNT: 14.1 % (ref 11.5–14.5)
GLUCOSE SERPL-MCNC: 84 MG/DL (ref 74–106)
HCT VFR BLD CALC: 25.7 % (ref 42–54)
HGB A MFR BLD: 97.6 % (ref 96.4–98.8)
HGB A2 BLD COLUMN CHROM-SCNC: 2.4 % (ref 1.8–3.2)
HGB BLD-MCNC: 8.8 G/DL (ref 13.5–17.5)
HGB C MFR BLD: 0 %
HGB F MFR BLD: 0 % (ref 0–2)
HGB S MFR BLD: 0 %
IMM GRANULOCYTES NFR BLD: 0.7 % (ref 0–5)
LYMPHOCYTES NFR BLD AUTO: 5.8 % (ref 15–50)
MCH RBC QN AUTO: 31.9 PG (ref 26–34)
MCHC RBC AUTO-ENTMCNC: 34.2 G/DL (ref 31–37)
MCV RBC: 93.1 FL (ref 80–100)
MONOCYTES NFR BLD: 9.2 % (ref 2–11)
NEUTROPHILS NFR BLD AUTO: 82.5 % (ref 40–80)
OSMOLALITY SERPL CALC.SUM OF ELEC: 297 MOSM/KG (ref 275–300)
PLATELET # BLD: 176 10X3/UL (ref 130–400)
PMV BLD AUTO: 10.9 FL (ref 7.4–10.4)
POTASSIUM SERPL-SCNC: 3.8 MMOL/L (ref 3.5–5.1)
RBC # BLD AUTO: 2.76 10X6/UL (ref 4.2–6.1)
SODIUM SERPL-SCNC: 139 MMOL/L (ref 136–145)
WBC # BLD AUTO: 10.2 10X3/UL (ref 4.8–10.8)

## 2019-02-21 VITALS — DIASTOLIC BLOOD PRESSURE: 96 MMHG | SYSTOLIC BLOOD PRESSURE: 165 MMHG

## 2019-02-21 VITALS — DIASTOLIC BLOOD PRESSURE: 84 MMHG | SYSTOLIC BLOOD PRESSURE: 144 MMHG

## 2019-02-21 VITALS — DIASTOLIC BLOOD PRESSURE: 91 MMHG | SYSTOLIC BLOOD PRESSURE: 144 MMHG

## 2019-02-21 LAB
ANION GAP SERPL CALC-SCNC: 20.6 MMOL/L (ref 8–16)
BASOPHILS NFR BLD AUTO: 0.2 % (ref 0–2)
BUN SERPL-MCNC: 67 MG/DL (ref 7–18)
CALCIUM SERPL-MCNC: 8.6 MG/DL (ref 8.5–10.1)
CHLORIDE SERPL-SCNC: 99 MMOL/L (ref 98–107)
CO2 SERPL-SCNC: 23.5 MMOL/L (ref 21–32)
CREAT SERPL-MCNC: 9.3 MG/DL (ref 0.6–1.3)
EOSINOPHIL NFR BLD: 1.3 % (ref 0–7)
ERYTHROCYTE [DISTWIDTH] IN BLOOD BY AUTOMATED COUNT: 14 % (ref 11.5–14.5)
G6PD RBC GLOCK & MCLEAN-CCNT: 2.99 X10E6/UL (ref 4.14–5.8)
G6PD RBC GLOCK & MCLEAN-CCNT: 378 (ref 146–376)
GLUCOSE SERPL-MCNC: 90 MG/DL (ref 74–106)
HCT VFR BLD CALC: 29.5 % (ref 42–54)
HGB BLD-MCNC: 10 G/DL (ref 13.5–17.5)
IMM GRANULOCYTES NFR BLD: 0.9 % (ref 0–5)
LDH1 SERPL-CCNC: 678 U/L (ref 85–227)
LYMPHOCYTES NFR BLD AUTO: 9.7 % (ref 15–50)
MCH RBC QN AUTO: 31.6 PG (ref 26–34)
MCHC RBC AUTO-ENTMCNC: 33.9 G/DL (ref 31–37)
MCV RBC: 93.4 FL (ref 80–100)
MONOCYTES NFR BLD: 7.7 % (ref 2–11)
NEUTROPHILS NFR BLD AUTO: 80.2 % (ref 40–80)
OSMOLALITY SERPL CALC.SUM OF ELEC: 296 MOSM/KG (ref 275–300)
PLATELET # BLD: 228 10X3/UL (ref 130–400)
PMV BLD AUTO: 11.1 FL (ref 7.4–10.4)
POTASSIUM SERPL-SCNC: 4.1 MMOL/L (ref 3.5–5.1)
RBC # BLD AUTO: 3.16 10X6/UL (ref 4.2–6.1)
SCREEN APTT: 33.8 SEC (ref 0–51.9)
SCREEN DRVVT: 50.5 SEC (ref 0–47)
SODIUM SERPL-SCNC: 139 MMOL/L (ref 136–145)
THROMBIN TIME: 14.6 SEC (ref 0–23)
WBC # BLD AUTO: 12.4 10X3/UL (ref 4.8–10.8)

## 2019-02-21 NOTE — MORECARE
CASE MANAGEMENT DISCHARGE SUMMARY
 
 
PATIENT: AFRICA GARRETT                 UNIT: [id number]
ACCOUNT#: [id number]                       ADM DATE: 02/15/19
AGE: 38     : 82  SEX: M            ROOM/BED: D.2140    
AUTHOR: KRANTHI VALDES                             PHYSICIAN:                               
 
REFERRING PHYSICIAN: FERNANDO BAIN MD             
DATE OF SERVICE: 19
Discharge Plan
 
 
Patient Name: AFRICA GARRETT
Facility: [facility]:[place]
Encounter #: [id number]
Medical Record #: [id number]
: 1980
Planned Disposition: Home
Anticipated Discharge Date: 19
 
Discharge Date: 
Expected LOS: 6
Initial Reviewer: [id number]
Initial Review Date: 2019
Generated: 19   1:52 pm 
Comments
 
DCP- Discharge Planning
 
Updated by [id number]: Dionicio aYng on 19  11:45 am CT
Patient Name: AFRICA GARRETT                                     
Admission Status: ER   
Accout number: [id number]                              
Admission Date: 02-   
: 1980                                                        
Admission Diagnosis:STENOSIS OF OTHER VASCULAR PROSTH DEV/GRFT, INIT   
Attending: FERNANDO BAIN                                                
Current LOS:  6   
  
Anticipated DC Date: 2019   
Planned Disposition: Home   
Primary Insurance: MEDICARE A & B   
  
  
Discharge Planning Comments:   
CM RECEIVED NURSING MESSAGE FOR DISCHARGE PLANNING.  CM MET WITH PT AND 
SPOUSE IN ROOM TO DISCUSS DISCHARGE PLANNING AND NEEDS. AFRICA GARRETT 
provided verbal consent to discuss current and ongoing needs with/in the 
presence of: SPOUSE, KURTIS.  PT REPORTS LIVING AT HOME INDEPENDENTLY WITH 
 
SPUOSE. PT HAS WALKER HE DOES NOT USE AND A CANE HE RARELY USES WITH NO 
MEDICAL EQUIPMENT PROVIDER PREFERENCE.  PT HAS NO OUTSIDE SERVICES ASSISTING 
IN THE HOME. PT DRIVES HIMSELF TO AND FROM DIALYSIS IN [place] ON MWF 1000AM 
SCHEDULE.  CM DISCUSSED AVAILABILITY OF HOME HEALTH, REHAB SERVICES AND 
MEDICAL EQUIPMENT. PT DENIES DISCHARGE NEEDS, REPORTS HIS WIFE WILL PICK HIM 
UP TODAY FOR DISCHARGE HOME. IMPORTANT MESSAGE FROM MEDICARE PROVIDED AND 
EXPLAINED.   NURSE NOTIFIED.  
  
  
: Dionicio Yang
 DCPIA - Discharge Planning Initial Assessment
 
Updated by [email or web address]: Dionicio Yang on 19  12:34 pm
*  Is the patient Alert and Oriented?
Yes
*  How many steps to enter\exit or inside your home? NONE *  PCP DR. CAMARENA IN
[place]  RENAL DOCTOR:  DR. STATON
 
*  Pharmacy
[facility] IN [place]
*  Preadmission Environment
Home with Family
*  ADLs
Independent
*  Equipment
Cane
Walker
*  Other Equipment
NOT USING WALKER, RARELY USES CANE  NO MEDICAL EQUIPMENT PROVIDER PREFERENCE
 
*  List name and contact numbers for known caregivers / representatives who 
currently or will assist patient after discharge:
KURTIS GARRETT, SPOUSE, [phone]
*  Verbal permission to speak to the caregivers and representatives has been 
obtained from the patient.
Yes
*  Community resources currently utilized
Other
*  Please name any agencies selected above.
OUTPATIENT DIALYSIS, [place], MWF, 1000AM, PT DRIVES SELF
*  Additional services required to return to the preadmission environment?
No
*  Can the patient safely return to the preadmission environment?
Yes
*  Has this patient been hospitalized within the prior 30 days at any 
hospital?
No
 
 
 
 
 
 
Coverage Notice
 
Reviewer: [email or web address] Emir Yang
 
Notice Issued Date-Time: 2019   9:45
Notice Type: IM Discharge Notice
 
Notice Delivered To: Patient
Relationship to Patient: 
Representative Name: 
 
Delivery Method: HAND - Hand Delivered
Ludy Days:
Prior Verbal Notification: 
 
Recipient Understood Notice: Yes
Recipient Signature: Yes
Med Rec Note Co-signed by Attending:
 
Coverage Notice Comment:  
 
Last DP export: 19  11:38 a
Patient Name: AFRICA GARRETT
Encounter #: [id number]
Page [id number]
 
 
 
 
 
Electronically Signed by KRANTHI VALDES on 19 at 1252
 
 
 
 
 
 
**All edits/amendments must be made on the electronic document**
 
DICTATION DATE: 19 1252     : FIGUEROA  19 1252     
RPT#: [id number]                                DC DATE:        
                                               STATUS: ADM IN  
[facility]
 [place] [address]
***END OF REPORT***

## 2019-02-21 NOTE — MORECARE
CASE MANAGEMENT DISCHARGE SUMMARY
 
 
PATIENT: AFRICA GARRETT                 UNIT: [id number]
ACCOUNT#: [id number]                       ADM DATE: 02/15/19
AGE: 38     : 82  SEX: M            ROOM/BED: D.2140    
AUTHOR: KRANTHI VALDES                             PHYSICIAN:                               
 
REFERRING PHYSICIAN: FERNANDO BAIN MD             
DATE OF SERVICE: 19
Discharge Plan
 
 
Patient Name: AFRICA GARRETT
Facility: [facility]:[place]
Encounter #: [id number]
Medical Record #: [id number]
: 1980
Planned Disposition: Home
Anticipated Discharge Date: 19
 
Discharge Date: 
Expected LOS: 6
Initial Reviewer: [id number]
Initial Review Date: 2019
Generated: 19   1:38 pm 
 DCPIA - Discharge Planning Initial Assessment
 
Updated by JANET: Dionicio Yang on 19  12:34 pm
*  Is the patient Alert and Oriented?
Yes
*  How many steps to enter\exit or inside your home? NONE *  PCP DR. CAMARENA IN
[place]  RENAL DOCTOR:  DR. STATON
 
*  Pharmacy
[facility] IN [place]
*  Preadmission Environment
Home with Family
*  ADLs
Independent
*  Equipment
Cane
Walker
*  Other Equipment
NOT USING WALKER, RARELY USES CANE  NO MEDICAL EQUIPMENT PROVIDER PREFERENCE
 
*  List name and contact numbers for known caregivers / representatives who 
currently or will assist patient after discharge:
KURTIS GARRETT, SPOUSE, [phone]
*  Verbal permission to speak to the caregivers and representatives has been 
 
obtained from the patient.
Yes
*  Community resources currently utilized
Other
*  Please name any agencies selected above.
OUTPATIENT DIALYSIS, [place], MWF, 1000AM, PT DRIVES SELF
*  Additional services required to return to the preadmission environment?
No
*  Can the patient safely return to the preadmission environment?
Yes
*  Has this patient been hospitalized within the prior 30 days at any 
hospital?
No
 
 
 
 
 
Coverage Notice
 
Reviewer: [id number] - Dionicio Yang
 
Notice Issued Date-Time: 2019   9:45
Notice Type: IM Discharge Notice
 
Notice Delivered To: Patient
Relationship to Patient: 
Representative Name: 
 
Delivery Method: HAND - Hand Delivered
Ludy Days:
Prior Verbal Notification: 
 
Recipient Understood Notice: Yes
Recipient Signature: Yes
Med Rec Note Co-signed by Attending:
 
Coverage Notice Comment:  
 
Last DP export: 2/21/19  11:31 a
Patient Name: AFRICA GARRETT
 
Encounter #: [id number]
Page 64989
 
 
 
 
 
Electronically Signed by KRANTHI VALDES on 19 at 1238
 
 
 
 
 
 
**All edits/amendments must be made on the electronic document**
 
DICTATION DATE: 19     : FIGUEROA  19 1238     
RPT#: [id number]                                DC DATE:        
                                               STATUS: ADM IN  
[facility]
[address] [place] [address]
***END OF REPORT***

## 2019-02-21 NOTE — MORECARE
CASE MANAGEMENT DISCHARGE SUMMARY
 
 
PATIENT: AFRICA GARRETT                 UNIT: [id number]
ACCOUNT#: [id number]                       ADM DATE: 02/15/19
AGE: 38     : 82  SEX: M            ROOM/BED: D.2140    
AUTHOR: KRANTHI VALDES                             PHYSICIAN:                               
 
REFERRING PHYSICIAN: FERNANDO BAIN MD             
DATE OF SERVICE: 19
Discharge Plan
 
 
Patient Name: AFRICA GARRETT
Facility: [facility]:[place]
Encounter #: [id number]
Medical Record #: [id number]
: 1980
Planned Disposition: Home
Anticipated Discharge Date: 19
 
Discharge Date: 
Expected LOS: 6
Initial Reviewer: [email or web address]
Initial Review Date: 2019
Generated: 19   1:31 pm 
  
 
 
 
 
 
 
Coverage Notice
 
Reviewer: [email or web address] - Doinicio Yang
 
Notice Issued Date-Time: 2019   9:45
Notice Type: IM Discharge Notice
 
Notice Delivered To: Patient
Relationship to Patient: 
Representative Name: 
 
Delivery Method: HAND - Hand Delivered
Ludy Days:
Prior Verbal Notification: 
 
Recipient Understood Notice: Yes
Recipient Signature: Yes
 
Med Rec Note Co-signed by Attending:
 
Coverage Notice Comment:  
Patient Name: AFRICA GARRETT
Encounter #: [id number]
Page [id number]
 
 
 
 
 
Electronically Signed by KRANTHI VALDES on 19 at 1231
 
 
 
 
 
 
**All edits/amendments must be made on the electronic document**
 
DICTATION DATE: 19 1230     : FIGUEROA  19 1230     
RPT#: [id number]                                DC DATE:        
                                               STATUS: ADM IN  
[place] [address] [place] [address]
***END OF REPORT***

## 2019-02-22 LAB
HCV AB S/CO SERPL IA: <0.1 S/CO RAT (ref 0–0.9)
LABORATORY COMMENT REPORT: (no result)

## 2019-02-28 NOTE — OP
PATIENT NAME:  AFRICA GARRETT                       MEDICAL RECORD: [id number]
:[birth year]                                             LOCATION:D.M2     D.2138
                                                         ADMISSION DATE:19
SURGEON:  SAE AYAAL MD          
 
 
DATE OF OPERATION:  2019
 
PREOPERATIVE DIAGNOSES:
1.  Vascular wire embedded and left upper extremity fistula.
2.  End-stage renal disease.
 
POSTOPERATIVE DIAGNOSES:
1.  Vascular wire embedded and left upper extremity fistula.
2.  End-stage renal disease.
 
PROCEDURE:
1.  Removal of vascular wire and left upper extremity fistula.
2.  Fistulogram with fluoroscopic interpretation.
3.  Angioplasty with 4 x 40 millimeter angioplasty balloon.
 
SURGEON:  Sae Ayala MD
 
REPORT OF PROCEDURE:  The patient's left upper extremity was prepped and draped
in sterile fashion.  The patient had a suture in place on the left arm with a
small amount of uncoiling wire present.  Using fluoroscopy, we could see that
the wire was imbedded in the tissue and appeared to be knotted up inside the
vascular structure.  We made a small cut down over top of this after we had
infused a total of 5 mL of 1% lidocaine with epinephrine into the surrounding
tissues.  As we pulled the wire, the wire kept trying to break.  Eventually the
wire broke off at its entrance into the vessel.  Using pressure, we stopped the
flow of the fistula and made a small incision in the graft and was able to dig
out the excess piece of wire material.  Using fluoroscopic guidance, we were
able to see that it was completely removed.  We then sutured the longitudinal
incision in the graft using running 7-0 Prolenes.  This discontinued any
bleeding from the incision site.  We then performed a fistulogram by inserting a
22-gauge butterfly needle into the most distal bend of the graft.  At this
point, it showed there was not good flow through the vessel, it was more
pulsatile with no evident thrill present.  The flow appeared to be sluggish even
though there did not appear to be any distinct sign of the clot present.  There
was a very small efferent venous access, which appeared to be the main
contributor to the sluggish flow.  Decision was made to angioplasty to this
area.  We inserted a micropuncture device followed by 5 sheath.  Through this
sheath, we were able to insert a 40 x 4 angioplasty balloon.  We were able to
dilate up this portion of the vessel all the way up to where we had performed
our fistulotomy.  At this point, we repeated the fistulogram and it showed there
was good flow through the vessel, which had greatly improved and the area of
stenosed vessel was markedly dilated.  Again, there was no evidence of any clot
present in the vessel.  The patient had a palpable thrill at this point.  We
then removed and the catheter and the Glidewire and this was oversewn with a 5-0
Monocryl, which discontinued any bleeding.  The skin incision was then closed
with a running subcutaneous 5-0 Monocryl and the two areas were dressed
appropriately.
 
COMPLICATIONS:  None.
 
CONDITION:  Stable.
 
 
 
 
OPERATIVE REPORT                               [id number]    AFRICA GARRETT     
 
 
ANESTHESIA:  Local MAC.
 
BLOOD LOSS:  100 mL.
 
TRANSINT:[id number] Voice Confirmation ID: [id number] DOCUMENT ID: [id number]
                                           
                                           SAE AYALA MD          
 
 
 
Electronically Signed by SAE AYALA on 19 at 0941
 
 
 
 
 
 
 
 
 
 
 
 
 
 
 
 
 
 
 
 
 
 
 
 
 
 
 
 
 
 
 
 
 
 
 
 
CC:                                                             [id number]
DICTATION DATE: 19 1531     :     19 1658      DIS IN  
                                                                      19
[facility]                                          
[address] [place] [address]

## 2019-02-28 NOTE — OP
PATIENT NAME:  AFRICA GARRETT                       MEDICAL RECORD: [id number]
:82                                             LOCATION:D.M2     D.2140
                                                         ADMISSION DATE:02/15/19
SURGEON:  SAE AYALA MD          
 
 
DATE OF OPERATION:  02/15/2019
 
PREOPERATIVE DIAGNOSES:
1.  Left upper extremity clotted arteriovenous graft.
2.  End-stage renal disease.
 
POSTOPERATIVE DIAGNOSES:
1.  Left upper extremity clotted arteriovenous graft.
2.  End-stage renal disease.
 
PROCEDURES:
1.  Left upper extremity fistulogram.
2.  AngioJet thrombectomy.
3.  Angioplasty with a 5 x 40 mm balloon times 2.
 
SURGEON:  Sae Ayala MD
 
REPORT OF PROCEDURE:  The patient's left upper extremity was prepped and draped
in sterile fashion.  We placed an antegrade sheath in the left distal forearm. 
This was done using an introducer needle followed by a small wire.  Over this
wire, we were able to place a small dilator and followed by another larger wire.
 Over this larger wire, we were able to place the 6-Luxembourger sheath.  With the
sheath in place, a fistulogram was performed.  Contrast flowed into the graft,
but did not go anywhere past the forearm.  We then inserted a 0.035 Glidewire
and we were able to pass this through the patient's graft up to its anastomosis
in the axillary vein.  Over this, we performed an AngioJet thrombectomy.  At the
conclusion of this, we had some marginal flow through the vessel.  We then
performed a retrograde sheath in the proximal aspect of the forearm graft.  This
retrograde sheath was placed using a micropuncture device again with a small
needle followed by the wire, followed by the dilator, followed by the larger
wire and then a 6-Luxembourger sheath.  With this 6-Luxembourger sheath, we were able to
pass another 0.035 Glidewire.  They were able to pass this through the arterial
anastomosis.  The AngioJet was inserted and we ran it through this afferent limb
of the AV graft.  At the conclusion of this, we had some decent flow, but
whenever we would remove the wire, the flow would stop through the arterial
anastomosis.  I never could get a good view of what was happening at the
arterial anastomosis, but I assume there was a stricture.  The 0.035 Glidewire
was again advanced through the arterial anastomosis with the graft and 5 x 40-mm
balloon was inserted.  This was insufflated on 3 separate portions of the
arterial anastomosis and at the conclusion of this we had good flow into the AV
graft.  We still had some clot present in the distal aspect of the graft as it
proceeded up the upper arm.  We ran the AngioJet few more times and cleared out
the clot that was present.  A fistulogram was performed and it showed good brisk
flow through the graft up to an area in the mid upper arm.  At this point, there
appeared to be some stricturing of the vein, I passed the 5 x 40 angioplasty
balloon up to this portion of the mid upper arm and performed a dilation of this
portion of the vein.  At the conclusion of this, we had even better flow
through the vessel.  We could feel a palpable thrill through the vessel up to
the patient's left axilla all the way down to the antecubital space where the
anastomosis was present.  We could feel a good palpable pulse through the AV
graft and followup fistulogram showed no evidence of any clot present throughout
the graft.  At this point, the sheaths were removed and were oversewn with
 
 
 
OPERATIVE REPORT                               [id number]    AFRICA GARRETT     
 
 
figure-of-eight 5-0 Monocryl until there was no bleeding present.  The patient
continued to have good palpable thrill through the vessel at the conclusion of
the case.
 
COMPLICATIONS:  None.
 
CONDITION:  Stable.
 
ANESTHESIA:  General endotracheal.
 
BLOOD LOSS:  100 mL.
 
TRANSINT:[id number] Voice Confirmation ID: [id number] DOCUMENT ID: [id number]
                                           
                                           SAE AYALA MD          
 
 
 
Electronically Signed by SAE AYALA on 19 at 0941
 
 
 
 
 
 
 
 
 
 
 
 
 
 
 
 
 
 
 
 
 
 
 
 
 
 
 
 
CC: ARIANNE ROJAS MD                                            [id number]
DICTATION DATE: 02/15/19 174     :     19 0108      DIS IN  
                                                                      19
[facility]                                          
[address] [place] [address]

## 2019-08-14 ENCOUNTER — HOSPITAL ENCOUNTER (EMERGENCY)
Dept: HOSPITAL 84 - D.ER | Age: 39
Discharge: HOME | End: 2019-08-14
Payer: MEDICARE

## 2019-08-14 VITALS — SYSTOLIC BLOOD PRESSURE: 118 MMHG | DIASTOLIC BLOOD PRESSURE: 62 MMHG

## 2019-08-14 VITALS — BODY MASS INDEX: 31.9 KG/M2 | WEIGHT: 235.49 LBS | HEIGHT: 72 IN

## 2019-08-14 DIAGNOSIS — I12.0: ICD-10-CM

## 2019-08-14 DIAGNOSIS — N18.6: ICD-10-CM

## 2019-08-14 DIAGNOSIS — N20.1: ICD-10-CM

## 2019-08-14 DIAGNOSIS — R10.31: Primary | ICD-10-CM

## 2019-08-14 LAB
ALBUMIN SERPL-MCNC: 3.5 G/DL (ref 3.4–5)
ALP SERPL-CCNC: 57 U/L (ref 46–116)
ALT SERPL-CCNC: 12 U/L (ref 10–68)
ANION GAP SERPL CALC-SCNC: 22.4 MMOL/L (ref 8–16)
APPEARANCE UR: CLEAR
BACTERIA #/AREA URNS HPF: (no result) /HPF
BASOPHILS NFR BLD AUTO: 0.1 % (ref 0–2)
BILIRUB SERPL-MCNC: 0.41 MG/DL (ref 0.2–1.3)
BILIRUB SERPL-MCNC: NEGATIVE MG/DL
BUN SERPL-MCNC: 96 MG/DL (ref 7–18)
CALCIUM SERPL-MCNC: 9.3 MG/DL (ref 8.5–10.1)
CHLORIDE SERPL-SCNC: 104 MMOL/L (ref 98–107)
CO2 SERPL-SCNC: 20.7 MMOL/L (ref 21–32)
COLOR UR: YELLOW
CREAT SERPL-MCNC: 11.3 MG/DL (ref 0.6–1.3)
EOSINOPHIL NFR BLD: 0.7 % (ref 0–7)
ERYTHROCYTE [DISTWIDTH] IN BLOOD BY AUTOMATED COUNT: 14.4 % (ref 11.5–14.5)
GLOBULIN SER-MCNC: 3.2 G/L
GLUCOSE SERPL-MCNC: 102 MG/DL (ref 74–106)
GLUCOSE SERPL-MCNC: 50 MG/DL
HCT VFR BLD CALC: 35.9 % (ref 42–54)
HGB BLD-MCNC: 12.2 G/DL (ref 13.5–17.5)
IMM GRANULOCYTES NFR BLD: 0.4 % (ref 0–5)
KETONES UR STRIP-MCNC: NEGATIVE MG/DL
LYMPHOCYTES NFR BLD AUTO: 2.9 % (ref 15–50)
MCH RBC QN AUTO: 31.8 PG (ref 26–34)
MCHC RBC AUTO-ENTMCNC: 34 G/DL (ref 31–37)
MCV RBC: 93.5 FL (ref 80–100)
MONOCYTES NFR BLD: 8.7 % (ref 2–11)
NEUTROPHILS NFR BLD AUTO: 87.2 % (ref 40–80)
NITRITE UR-MCNC: NEGATIVE MG/ML
OSMOLALITY SERPL CALC.SUM OF ELEC: 312 MOSM/KG (ref 275–300)
PH UR STRIP: 5 [PH] (ref 5–6)
PLATELET # BLD: 192 10X3/UL (ref 130–400)
PMV BLD AUTO: 10.8 FL (ref 7.4–10.4)
POTASSIUM SERPL-SCNC: 5.1 MMOL/L (ref 3.5–5.1)
PROT SERPL-MCNC: 6.7 G/DL (ref 6.4–8.2)
PROT UR-MCNC: (no result) MG/DL
RBC # BLD AUTO: 3.84 10X6/UL (ref 4.2–6.1)
RBC #/AREA URNS HPF: (no result) /HPF (ref 0–5)
SODIUM SERPL-SCNC: 142 MMOL/L (ref 136–145)
SP GR UR STRIP: 1.01 (ref 1–1.02)
SQUAMOUS #/AREA URNS HPF: (no result) /HPF (ref 0–5)
UROBILINOGEN UR-MCNC: NORMAL MG/DL
WBC # BLD AUTO: 14.3 10X3/UL (ref 4.8–10.8)
WBC #/AREA URNS HPF: (no result) /HPF (ref 0–5)

## 2019-12-27 ENCOUNTER — HOSPITAL ENCOUNTER (OUTPATIENT)
Dept: HOSPITAL 84 - D.OPS | Age: 39
Discharge: HOME | End: 2019-12-27
Attending: SURGERY
Payer: MEDICARE

## 2019-12-27 VITALS — BODY MASS INDEX: 32.64 KG/M2 | HEIGHT: 72 IN | WEIGHT: 241 LBS

## 2019-12-27 DIAGNOSIS — S51.802A: Primary | ICD-10-CM

## 2019-12-27 DIAGNOSIS — Z87.821: ICD-10-CM

## 2019-12-27 DIAGNOSIS — N18.6: ICD-10-CM

## 2019-12-27 DIAGNOSIS — Z72.0: ICD-10-CM
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
## 2019-12-27 LAB
ANION GAP SERPL CALC-SCNC: 19.7 MMOL/L (ref 8–16)
APTT BLD: 28.9 SECONDS (ref 22.8–39.4)
BASOPHILS NFR BLD AUTO: 0.3 % (ref 0–2)
BUN SERPL-MCNC: 48 MG/DL (ref 7–18)
CALCIUM SERPL-MCNC: 9.2 MG/DL (ref 8.5–10.1)
CHLORIDE SERPL-SCNC: 103 MMOL/L (ref 98–107)
CO2 SERPL-SCNC: 21.7 MMOL/L (ref 21–32)
CREAT SERPL-MCNC: 0.6 MG/DL (ref 0.6–1.3)
EOSINOPHIL NFR BLD: 0.9 % (ref 0–7)
ERYTHROCYTE [DISTWIDTH] IN BLOOD BY AUTOMATED COUNT: 14.2 % (ref 11.5–14.5)
GLUCOSE SERPL-MCNC: 90 MG/DL (ref 74–106)
HCT VFR BLD CALC: 46.2 % (ref 42–54)
HGB BLD-MCNC: 15.3 G/DL (ref 13.5–17.5)
IMM GRANULOCYTES NFR BLD: 0.5 % (ref 0–5)
INR PPP: 1.12 (ref 0.85–1.17)
LYMPHOCYTES NFR BLD AUTO: 5.7 % (ref 15–50)
MCH RBC QN AUTO: 32.5 PG (ref 26–34)
MCHC RBC AUTO-ENTMCNC: 33.1 G/DL (ref 31–37)
MCV RBC: 98.1 FL (ref 80–100)
MONOCYTES NFR BLD: 10.1 % (ref 2–11)
NEUTROPHILS NFR BLD AUTO: 82.5 % (ref 40–80)
OSMOLALITY SERPL CALC.SUM OF ELEC: 290 MOSM/KG (ref 275–300)
PLATELET # BLD: 185 10X3/UL (ref 130–400)
PMV BLD AUTO: 10.8 FL (ref 7.4–10.4)
POTASSIUM SERPL-SCNC: 5.4 MMOL/L (ref 3.5–5.1)
PROTHROMBIN TIME: 13.9 SECONDS (ref 11.6–15)
RBC # BLD AUTO: 4.71 10X6/UL (ref 4.2–6.1)
SODIUM SERPL-SCNC: 139 MMOL/L (ref 136–145)
WBC # BLD AUTO: 11.2 10X3/UL (ref 4.8–10.8)

## 2019-12-27 NOTE — NUR
PATIENT AMBULATING AROUND ROOM WITHOUT UNSTEADINESS OR DIZZINESS, PIV
DC'D WITH TIP INTACT. DISCHARGE INSTRUCTIONS REVIEWED WITH PATIENT
AND SPOUSE, DISCHARGED HOME VIA WHEELCHAIR TO PRIVATE VEHICLE WITH
SPOUSE

## 2019-12-30 NOTE — OP
PATIENT NAME:  AFRICA GARRETT                       MEDICAL RECORD: [id number]
:82                                             LOCATION:D.OPS          
                                                         ADMISSION DATE:        
SURGEON:  SILVESTRE HAGEN MD            
 
 
DATE OF OPERATION:  2019
 
PREOPERATIVE DIAGNOSIS:  Infected left forearm wound.
 
POSTOPERATIVE DIAGNOSIS:  Infected left forearm graft material (old graft
material not being utilized for hemodialysis access).
 
PROCEDURE:  Excisional debridement of left forearm with removal of foreign body.
 
SURGEON:  Silvestre Hagen MD
 
ASSISTANT:  None.
 
BLOOD LOSS:  Minimal.
 
ANESTHESIA:  General.
 
COMPLICATIONS:  None.
 
The risks, possible complications, and alternatives to the procedure were
explained to the patient.  He elects to proceed.  The discussion specifically
included, but was not limited to, bleeding requiring emergency operation,
infection, nerve injury, vascular injury.
 
OPERATIVE COURSE:  The patient was conveyed to the operating room electively on
2019.  General anesthesia was induced by anesthesia staff.  The patient's
left upper extremity was abducted at 90 degrees to the patient's trunk.  Through
the use of double curvilinear incisions, I incised the skin and subcutaneous
tissue overlying the draining wound.  Dimensions of the debridement, including
margins, measured 2.4 x 1.9 cm included skin and subcutaneous tissue as well as
the draining wound.
 
I followed this draining wound down to a piece of graft material.  I performed a
subcutaneous dissection and identified that the graft material went proximally
on the volar surface of the medial aspect of the left arm.  I extended my
incision proximally.  The graft material was quite lengthy.  I then continued my
dissection until I felt that further dissection might lead to a vascular injury.
 I then transected the vascular material at this site.
 
I irrigated with normal saline and aspirated.  The skin was closed with
interrupted 3-0 Vicryls for the subcutaneous tissues as well as multiple
interrupted horizontal mattress 2-0 Vicryls for the skin.  A sterile dressing
was applied.
 
The patient was then extubated and conveyed to post-anesthesia care unit where
he was in stable condition.  He had good range of motion in flexion and
extension of the left wrist.  Good flexion and extension of the fingers of the
left hand.  No areas of numbness or paresthesias distal to the site of the
incision.
 
TRANSINT:[id number] Voice Confirmation ID: [id number] DOCUMENT ID: [id number]
 
 
 
OPERATIVE REPORT                               [id number]    GERRYAFRICA SILVESTRE LUCERO MD            
 
 
 
Electronically Signed by SILVESTRE HAGEN on 19 at 1358
 
 
 
 
 
 
 
 
 
 
 
 
 
 
 
 
 
 
 
 
 
 
 
 
 
 
 
 
 
 
 
 
 
 
 
 
 
 
 
 
 
CC:                                                             [id number]
DICTATION DATE: 19 180     :     19 0311      [facility] 
                                                                      19
[id number] [place] [address]